# Patient Record
Sex: MALE | Race: AMERICAN INDIAN OR ALASKA NATIVE | NOT HISPANIC OR LATINO
[De-identification: names, ages, dates, MRNs, and addresses within clinical notes are randomized per-mention and may not be internally consistent; named-entity substitution may affect disease eponyms.]

---

## 2017-12-30 ENCOUNTER — RESULT REVIEW (OUTPATIENT)
Age: 57
End: 2017-12-30

## 2018-01-13 ENCOUNTER — RESULT REVIEW (OUTPATIENT)
Age: 58
End: 2018-01-13

## 2023-07-07 ENCOUNTER — EMERGENCY (EMERGENCY)
Facility: HOSPITAL | Age: 63
LOS: 1 days | Discharge: HOME | End: 2023-07-07
Attending: STUDENT IN AN ORGANIZED HEALTH CARE EDUCATION/TRAINING PROGRAM
Payer: MEDICARE

## 2023-07-07 VITALS
WEIGHT: 209.44 LBS | SYSTOLIC BLOOD PRESSURE: 121 MMHG | RESPIRATION RATE: 18 BRPM | TEMPERATURE: 100 F | OXYGEN SATURATION: 97 % | DIASTOLIC BLOOD PRESSURE: 73 MMHG | HEART RATE: 74 BPM | HEIGHT: 72 IN

## 2023-07-07 DIAGNOSIS — Z98.89 OTHER SPECIFIED POSTPROCEDURAL STATES: Chronic | ICD-10-CM

## 2023-07-07 PROCEDURE — 99285 EMERGENCY DEPT VISIT HI MDM: CPT

## 2023-07-07 RX ORDER — KETOROLAC TROMETHAMINE 30 MG/ML
15 SYRINGE (ML) INJECTION ONCE
Refills: 0 | Status: DISCONTINUED | OUTPATIENT
Start: 2023-07-07 | End: 2023-07-07

## 2023-07-07 RX ORDER — SODIUM CHLORIDE 9 MG/ML
1000 INJECTION INTRAMUSCULAR; INTRAVENOUS; SUBCUTANEOUS ONCE
Refills: 0 | Status: COMPLETED | OUTPATIENT
Start: 2023-07-07 | End: 2023-07-07

## 2023-07-07 RX ORDER — LIDOCAINE 4 G/100G
10 CREAM TOPICAL ONCE
Refills: 0 | Status: COMPLETED | OUTPATIENT
Start: 2023-07-07 | End: 2023-07-07

## 2023-07-07 NOTE — ED ADULT TRIAGE NOTE - CHIEF COMPLAINT QUOTE
" I have pain when I swallow, cough  knee pain, back pain two days, hiccups since last night  "  Referred by PMD seen yesterday  Patient had Stem cell done last year " I have pain when I swallow, cough  knee pain, back pain two days, hiccups since last night  "  Referred by PMD seen yesterday

## 2023-07-08 VITALS
HEART RATE: 77 BPM | TEMPERATURE: 100 F | RESPIRATION RATE: 16 BRPM | SYSTOLIC BLOOD PRESSURE: 100 MMHG | DIASTOLIC BLOOD PRESSURE: 66 MMHG | OXYGEN SATURATION: 95 %

## 2023-07-08 LAB
ALBUMIN SERPL ELPH-MCNC: 3.4 G/DL — LOW (ref 3.5–5)
ALP SERPL-CCNC: 56 U/L — SIGNIFICANT CHANGE UP (ref 40–120)
ALT FLD-CCNC: 28 U/L DA — SIGNIFICANT CHANGE UP (ref 10–60)
ANION GAP SERPL CALC-SCNC: 7 MMOL/L — SIGNIFICANT CHANGE UP (ref 5–17)
AST SERPL-CCNC: 26 U/L — SIGNIFICANT CHANGE UP (ref 10–40)
BASOPHILS # BLD AUTO: 0.02 K/UL — SIGNIFICANT CHANGE UP (ref 0–0.2)
BASOPHILS NFR BLD AUTO: 0.3 % — SIGNIFICANT CHANGE UP (ref 0–2)
BILIRUB SERPL-MCNC: 0.3 MG/DL — SIGNIFICANT CHANGE UP (ref 0.2–1.2)
BUN SERPL-MCNC: 11 MG/DL — SIGNIFICANT CHANGE UP (ref 7–18)
CALCIUM SERPL-MCNC: 8.2 MG/DL — LOW (ref 8.4–10.5)
CHLORIDE SERPL-SCNC: 98 MMOL/L — SIGNIFICANT CHANGE UP (ref 96–108)
CO2 SERPL-SCNC: 27 MMOL/L — SIGNIFICANT CHANGE UP (ref 22–31)
CREAT SERPL-MCNC: 0.86 MG/DL — SIGNIFICANT CHANGE UP (ref 0.5–1.3)
EGFR: 97 ML/MIN/1.73M2 — SIGNIFICANT CHANGE UP
EOSINOPHIL # BLD AUTO: 0.16 K/UL — SIGNIFICANT CHANGE UP (ref 0–0.5)
EOSINOPHIL NFR BLD AUTO: 2.7 % — SIGNIFICANT CHANGE UP (ref 0–6)
FLUAV AG NPH QL: SIGNIFICANT CHANGE UP
FLUBV AG NPH QL: SIGNIFICANT CHANGE UP
GLUCOSE SERPL-MCNC: 91 MG/DL — SIGNIFICANT CHANGE UP (ref 70–99)
HCT VFR BLD CALC: 38.7 % — LOW (ref 39–50)
HGB BLD-MCNC: 13.3 G/DL — SIGNIFICANT CHANGE UP (ref 13–17)
HIV 1 & 2 AB SERPL IA.RAPID: SIGNIFICANT CHANGE UP
IMM GRANULOCYTES NFR BLD AUTO: 0.3 % — SIGNIFICANT CHANGE UP (ref 0–0.9)
LIDOCAIN IGE QN: 172 U/L — SIGNIFICANT CHANGE UP (ref 73–393)
LYMPHOCYTES # BLD AUTO: 0.7 K/UL — LOW (ref 1–3.3)
LYMPHOCYTES # BLD AUTO: 11.9 % — LOW (ref 13–44)
MAGNESIUM SERPL-MCNC: 1.8 MG/DL — SIGNIFICANT CHANGE UP (ref 1.6–2.6)
MCHC RBC-ENTMCNC: 29.8 PG — SIGNIFICANT CHANGE UP (ref 27–34)
MCHC RBC-ENTMCNC: 34.4 GM/DL — SIGNIFICANT CHANGE UP (ref 32–36)
MCV RBC AUTO: 86.8 FL — SIGNIFICANT CHANGE UP (ref 80–100)
MONOCYTES # BLD AUTO: 0.8 K/UL — SIGNIFICANT CHANGE UP (ref 0–0.9)
MONOCYTES NFR BLD AUTO: 13.7 % — SIGNIFICANT CHANGE UP (ref 2–14)
NEUTROPHILS # BLD AUTO: 4.16 K/UL — SIGNIFICANT CHANGE UP (ref 1.8–7.4)
NEUTROPHILS NFR BLD AUTO: 71.1 % — SIGNIFICANT CHANGE UP (ref 43–77)
NRBC # BLD: 0 /100 WBCS — SIGNIFICANT CHANGE UP (ref 0–0)
NT-PROBNP SERPL-SCNC: 738 PG/ML — HIGH (ref 0–125)
PLATELET # BLD AUTO: 119 K/UL — LOW (ref 150–400)
POTASSIUM SERPL-MCNC: 4.1 MMOL/L — SIGNIFICANT CHANGE UP (ref 3.5–5.3)
POTASSIUM SERPL-SCNC: 4.1 MMOL/L — SIGNIFICANT CHANGE UP (ref 3.5–5.3)
PROT SERPL-MCNC: 6.6 G/DL — SIGNIFICANT CHANGE UP (ref 6–8.3)
RBC # BLD: 4.46 M/UL — SIGNIFICANT CHANGE UP (ref 4.2–5.8)
RBC # FLD: 11.3 % — SIGNIFICANT CHANGE UP (ref 10.3–14.5)
SARS-COV-2 RNA SPEC QL NAA+PROBE: DETECTED
SODIUM SERPL-SCNC: 132 MMOL/L — LOW (ref 135–145)
TROPONIN I, HIGH SENSITIVITY RESULT: 9.2 NG/L — SIGNIFICANT CHANGE UP
TSH SERPL-MCNC: 1.05 UU/ML — SIGNIFICANT CHANGE UP (ref 0.34–4.82)
WBC # BLD: 5.86 K/UL — SIGNIFICANT CHANGE UP (ref 3.8–10.5)
WBC # FLD AUTO: 5.86 K/UL — SIGNIFICANT CHANGE UP (ref 3.8–10.5)

## 2023-07-08 PROCEDURE — 71046 X-RAY EXAM CHEST 2 VIEWS: CPT | Mod: 26

## 2023-07-08 PROCEDURE — 99285 EMERGENCY DEPT VISIT HI MDM: CPT

## 2023-07-08 PROCEDURE — 96374 THER/PROPH/DIAG INJ IV PUSH: CPT

## 2023-07-08 PROCEDURE — 96375 TX/PRO/DX INJ NEW DRUG ADDON: CPT

## 2023-07-08 PROCEDURE — 71046 X-RAY EXAM CHEST 2 VIEWS: CPT

## 2023-07-08 RX ORDER — ACETAMINOPHEN 500 MG
650 TABLET ORAL ONCE
Refills: 0 | Status: COMPLETED | OUTPATIENT
Start: 2023-07-08 | End: 2023-07-08

## 2023-07-08 RX ORDER — CALCIUM GLUCONATE 100 MG/ML
1 VIAL (ML) INTRAVENOUS ONCE
Refills: 0 | Status: COMPLETED | OUTPATIENT
Start: 2023-07-08 | End: 2023-07-08

## 2023-07-08 RX ADMIN — Medication 100 GRAM(S): at 01:48

## 2023-07-08 RX ADMIN — LIDOCAINE 10 MILLILITER(S): 4 CREAM TOPICAL at 02:08

## 2023-07-08 RX ADMIN — Medication 30 MILLILITER(S): at 01:48

## 2023-07-08 RX ADMIN — SODIUM CHLORIDE 1000 MILLILITER(S): 9 INJECTION INTRAMUSCULAR; INTRAVENOUS; SUBCUTANEOUS at 01:49

## 2023-07-08 RX ADMIN — Medication 15 MILLIGRAM(S): at 06:36

## 2023-07-08 RX ADMIN — Medication 15 MILLIGRAM(S): at 02:30

## 2023-07-08 RX ADMIN — Medication 650 MILLIGRAM(S): at 06:36

## 2023-07-08 RX ADMIN — Medication 650 MILLIGRAM(S): at 04:05

## 2023-07-08 NOTE — ED PROVIDER NOTE - CARE PLAN
Principal Discharge DX:	2019 novel coronavirus disease (COVID-19)  Secondary Diagnosis:	Hypocalcemia  Secondary Diagnosis:	Dehydration   1

## 2023-07-08 NOTE — ED ADULT NURSE NOTE - CHIEF COMPLAINT QUOTE
" I have pain when I swallow, cough  knee pain, back pain two days, hiccups since last night  "  Referred by PMD seen yesterday

## 2023-07-08 NOTE — ED ADULT NURSE NOTE - NSFALLUNIVINTERV_ED_ALL_ED
Bed/Stretcher in lowest position, wheels locked, appropriate side rails in place/Call bell, personal items and telephone in reach/Instruct patient to call for assistance before getting out of bed/chair/stretcher/Non-slip footwear applied when patient is off stretcher/[place] to call system/Physically safe environment - no spills, clutter or unnecessary equipment/Purposeful proactive rounding/Room/bathroom lighting operational, light cord in reach

## 2023-07-08 NOTE — ED ADULT NURSE NOTE - PRIMARY CARE PROVIDER
unkn
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 \par \par TECHNIQUE: \par \par Sagittal T1, axial T2, FLAIR, and diffusion-weighted imaging of the brain \par was obtained along with coronal and sagittal FLAIR followed by thin section \par pre- contrast axial and coronal T1 imaging through the internal auditory \par canal and additional axial FIESTA sequence through the brain stem. \par Post-contrast thin section axial T1 with fat suppression and coronal T1 \par without fat suppression images were obtained of the internal auditory canal \par followed by whole brain axial, coronal, and sagittal T1 images. 6cc of \par Gadavist was given. \par \par COMPARISON: None. 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 \par \par The ventricles are unremarkable with respect to size and configuration. \par There is minimal nonspecific T2 prolongation within the periatrial \par periventricular white matter with a discrete focus in the left frontal deep \par white matter, within normal limits for patient age. There is no intracranial \par mass, midline shift, or focal mass effect present. No extra-axial collection \par is seen. There is no acute infarct on the diffusion-weighted sequence. \par Craniovertebral junction is normal. Sella turcica is unremarkable. \par \par Tympanomastoid cavities are predominantly clear. No air-fluid levels are \par seen in the paranasal sinuses. There is minor mucosal thickening in the \par ethmoid air cells bilaterally. Visualized vascular flow voids within limits \par of normal \par \par There is a right frontal lobe developmental venous anomaly. \par \par IMPRESSION: \par \par 1. Enhancing intracanalicular mass lesion in the right internal auditory \par canal consistent with an acoustic neuroma which measures up to 1.0 cm in \par length and extends to the porus acusticus with no extension into the right \par cerebellar pontine angle. \par 2. No brain parenchymal mass or acute infarct. \par 3. Right frontal lobe developmental venous anomaly. \par \par \par \par \par \par \par \par \par \par SHAYY RODRIGUEZ M.D., ATTENDING RADIOLOGIST \par This document has been electronically signed. Oct 4 2019 6:52PM \par

## 2023-07-08 NOTE — ED PROVIDER NOTE - CLINICAL SUMMARY MEDICAL DECISION MAKING FREE TEXT BOX
Florin Mehta DO: 62-year-old male, no reported PMH, PW 2 days of cough.  Patient reports malaise, body aches, F/C.  Also endorses hiccups.  Denies N/V, sick contacts, history of abdominal surgeries.  Denies CP, SOB.  Denies rash.  Denies weakness. Patient is HDS, well-appearing, neurovascular intact.  Found to have COVID while in ED.  After resuscitation and treatment, patient very well-appearing, ambulate throughout department.  Had extensive discussion with patient regarding care for COVID.  Has no comorbidities, did offer Paxlovid however patient declined.  Patient was due for overseas trip tomorrow however educated patient on risks of going overseas, patient states he will defer trip for now.  To follow-up with PCP.  Strict return precautions provided.

## 2023-07-08 NOTE — ED PROVIDER NOTE - NSFOLLOWUPINSTRUCTIONS_ED_ALL_ED_FT
1) Please follow up with your Primary Care Provider in 24-48 hours  2) Seek immediate medical care for any new or returning symptoms including but not limited severe pain, chest pain, shortness of breath, numbness and/or tinging  3) Take Tylenol 650 mg every 4-6 hours as needed for pain. Do not take more than 2 grams within a 24 hour period  4) Take Ibuprofen 400-600 mg every 4-6 hours as needed for pain. Do not take more than 1200 mg within a 24 hour period. Take this medication with food  5) Please ensure to remain hydrated

## 2023-07-08 NOTE — ED PROVIDER NOTE - PATIENT PORTAL LINK FT
You can access the FollowMyHealth Patient Portal offered by [facility] by registering at the following website: [email or web address]. By joining [facility]’s FollowMyHealth portal, you will also be able to view your health information using other applications (apps) compatible with our system.

## 2023-07-08 NOTE — ED PROVIDER NOTE - PHYSICAL EXAMINATION
anemia, diarrhea General: well appearing, interactive, well nourished, no apparent distress, ncat  HEENT: EOMI, PERRLA, normal mucosa, normal oropharynx, no lesions on the lips or on oral mucosa, normal external ear  Neck: supple, no lymphadenopathy, full range of motion, no nuchal rigidity  CV: RRR, normal S1 and S2 with no murmur, capillary refill less than two seconds, pp 2 +b/l  Resp: lungs CTA b/l, good aeration bilaterally, symmetric chest wall   Abd: non-distended, soft, non-tender  : no CVA tenderness  MSK: full range of motion, no cyanosis, no edema, no clubbing, no immobility  Neuro: CN II-XII grossly intact, muscle strength 5/5 in all extremities, normal gait  Skin: no rashes, skin intact

## 2023-07-08 NOTE — ED PROVIDER NOTE - OBJECTIVE STATEMENT
62-year-old male, no reported PMH, PW 2 days of cough.  Patient reports malaise, body aches, F/C.  Also endorses hiccups.  Denies N/V, sick contacts, history of abdominal surgeries.  Denies CP, SOB.  Denies rash.  Denies weakness.

## 2023-07-08 NOTE — ED ADULT NURSE NOTE - OBJECTIVE STATEMENT
pt came in c/o fever and throat pain for 2 days pt came in c/o fever and throat pain for 2 days, no acute distress, iv access and blood work done

## 2023-07-09 ENCOUNTER — EMERGENCY (EMERGENCY)
Facility: HOSPITAL | Age: 63
LOS: 1 days | Discharge: HOME | End: 2023-07-09
Attending: EMERGENCY MEDICINE
Payer: MEDICARE

## 2023-07-09 VITALS
TEMPERATURE: 98 F | SYSTOLIC BLOOD PRESSURE: 119 MMHG | HEART RATE: 70 BPM | DIASTOLIC BLOOD PRESSURE: 80 MMHG | OXYGEN SATURATION: 96 % | RESPIRATION RATE: 20 BRPM

## 2023-07-09 VITALS
TEMPERATURE: 98 F | HEIGHT: 72 IN | DIASTOLIC BLOOD PRESSURE: 84 MMHG | HEART RATE: 83 BPM | OXYGEN SATURATION: 100 % | SYSTOLIC BLOOD PRESSURE: 121 MMHG | RESPIRATION RATE: 18 BRPM | WEIGHT: 199.96 LBS

## 2023-07-09 DIAGNOSIS — Z98.89 OTHER SPECIFIED POSTPROCEDURAL STATES: Chronic | ICD-10-CM

## 2023-07-09 LAB
ALBUMIN SERPL ELPH-MCNC: 3.3 G/DL — LOW (ref 3.5–5)
ALP SERPL-CCNC: 53 U/L — SIGNIFICANT CHANGE UP (ref 40–120)
ALT FLD-CCNC: 32 U/L DA — SIGNIFICANT CHANGE UP (ref 10–60)
ANION GAP SERPL CALC-SCNC: 7 MMOL/L — SIGNIFICANT CHANGE UP (ref 5–17)
AST SERPL-CCNC: 43 U/L — HIGH (ref 10–40)
BASOPHILS # BLD AUTO: 0.02 K/UL — SIGNIFICANT CHANGE UP (ref 0–0.2)
BASOPHILS NFR BLD AUTO: 0.3 % — SIGNIFICANT CHANGE UP (ref 0–2)
BILIRUB SERPL-MCNC: 0.4 MG/DL — SIGNIFICANT CHANGE UP (ref 0.2–1.2)
BUN SERPL-MCNC: 7 MG/DL — SIGNIFICANT CHANGE UP (ref 7–18)
CALCIUM SERPL-MCNC: 8 MG/DL — LOW (ref 8.4–10.5)
CHLORIDE SERPL-SCNC: 102 MMOL/L — SIGNIFICANT CHANGE UP (ref 96–108)
CO2 SERPL-SCNC: 26 MMOL/L — SIGNIFICANT CHANGE UP (ref 22–31)
CREAT SERPL-MCNC: 0.8 MG/DL — SIGNIFICANT CHANGE UP (ref 0.5–1.3)
EGFR: 99 ML/MIN/1.73M2 — SIGNIFICANT CHANGE UP
EOSINOPHIL # BLD AUTO: 0.02 K/UL — SIGNIFICANT CHANGE UP (ref 0–0.5)
EOSINOPHIL NFR BLD AUTO: 0.3 % — SIGNIFICANT CHANGE UP (ref 0–6)
GLUCOSE SERPL-MCNC: 102 MG/DL — HIGH (ref 70–99)
HCT VFR BLD CALC: 40.9 % — SIGNIFICANT CHANGE UP (ref 39–50)
HGB BLD-MCNC: 14 G/DL — SIGNIFICANT CHANGE UP (ref 13–17)
IMM GRANULOCYTES NFR BLD AUTO: 0.3 % — SIGNIFICANT CHANGE UP (ref 0–0.9)
LYMPHOCYTES # BLD AUTO: 1.14 K/UL — SIGNIFICANT CHANGE UP (ref 1–3.3)
LYMPHOCYTES # BLD AUTO: 16.3 % — SIGNIFICANT CHANGE UP (ref 13–44)
MAGNESIUM SERPL-MCNC: 1.9 MG/DL — SIGNIFICANT CHANGE UP (ref 1.6–2.6)
MCHC RBC-ENTMCNC: 29.8 PG — SIGNIFICANT CHANGE UP (ref 27–34)
MCHC RBC-ENTMCNC: 34.2 GM/DL — SIGNIFICANT CHANGE UP (ref 32–36)
MCV RBC AUTO: 87 FL — SIGNIFICANT CHANGE UP (ref 80–100)
MONOCYTES # BLD AUTO: 0.62 K/UL — SIGNIFICANT CHANGE UP (ref 0–0.9)
MONOCYTES NFR BLD AUTO: 8.9 % — SIGNIFICANT CHANGE UP (ref 2–14)
NEUTROPHILS # BLD AUTO: 5.18 K/UL — SIGNIFICANT CHANGE UP (ref 1.8–7.4)
NEUTROPHILS NFR BLD AUTO: 73.9 % — SIGNIFICANT CHANGE UP (ref 43–77)
NRBC # BLD: 0 /100 WBCS — SIGNIFICANT CHANGE UP (ref 0–0)
NT-PROBNP SERPL-SCNC: 242 PG/ML — HIGH (ref 0–125)
PLATELET # BLD AUTO: 138 K/UL — LOW (ref 150–400)
POTASSIUM SERPL-MCNC: 3.8 MMOL/L — SIGNIFICANT CHANGE UP (ref 3.5–5.3)
POTASSIUM SERPL-SCNC: 3.8 MMOL/L — SIGNIFICANT CHANGE UP (ref 3.5–5.3)
PROT SERPL-MCNC: 6.5 G/DL — SIGNIFICANT CHANGE UP (ref 6–8.3)
RBC # BLD: 4.7 M/UL — SIGNIFICANT CHANGE UP (ref 4.2–5.8)
RBC # FLD: 11.6 % — SIGNIFICANT CHANGE UP (ref 10.3–14.5)
SODIUM SERPL-SCNC: 135 MMOL/L — SIGNIFICANT CHANGE UP (ref 135–145)
TROPONIN I, HIGH SENSITIVITY RESULT: 7.6 NG/L — SIGNIFICANT CHANGE UP
WBC # BLD: 7 K/UL — SIGNIFICANT CHANGE UP (ref 3.8–10.5)
WBC # FLD AUTO: 7 K/UL — SIGNIFICANT CHANGE UP (ref 3.8–10.5)

## 2023-07-09 PROCEDURE — 71260 CT THORAX DX C+: CPT | Mod: 26,MG

## 2023-07-09 PROCEDURE — G1004: CPT

## 2023-07-09 PROCEDURE — 99285 EMERGENCY DEPT VISIT HI MDM: CPT

## 2023-07-09 RX ORDER — KETOROLAC TROMETHAMINE 30 MG/ML
30 SYRINGE (ML) INJECTION ONCE
Refills: 0 | Status: DISCONTINUED | OUTPATIENT
Start: 2023-07-09 | End: 2023-07-09

## 2023-07-09 RX ORDER — DEXAMETHASONE 0.5 MG/5ML
6 ELIXIR ORAL ONCE
Refills: 0 | Status: COMPLETED | OUTPATIENT
Start: 2023-07-09 | End: 2023-07-09

## 2023-07-09 RX ADMIN — Medication 6 MILLIGRAM(S): at 13:34

## 2023-07-09 RX ADMIN — Medication 30 MILLIGRAM(S): at 13:34

## 2023-07-09 NOTE — ED ADULT TRIAGE NOTE - CHIEF COMPLAINT QUOTE
Cough and hiccups since friday. Endorses sore throat, difficulty swallowing and Bl rib cage pain with cough. Denies CP, SOB. Seen in Ed recently for same s/s.

## 2023-07-09 NOTE — ED PROVIDER NOTE - CLINICAL SUMMARY MEDICAL DECISION MAKING FREE TEXT BOX
62-year-old male no significant past medical history seen in ED yesterday found to have COVID presents to ED with cough and myalgias.  Symptoms likely related to COVID however patient says he feels symptoms are worsening.  Satting normal in the ED lungs clear on exam concern for possible superimposed infection.  Will get CT chest labs supportive care reassess

## 2023-07-09 NOTE — ED PROVIDER NOTE - PROGRESS NOTE DETAILS
patient signed out to me by Dr. Blue pending CT. CT showing multifocal pneumonia. likely 2/2 covid-19. patient updated on results. symptoms improved. stable for outpatient followup. mago Gale

## 2023-07-09 NOTE — ED PROVIDER NOTE - CCCP TRG CHIEF CMPLNT
RETURN TO THE EMERGENCY ROOM FOR THE FOLLOWING:    Severely worsened pain, expanding redness/swelling/tenderness, fever greater than 101, or at anytime for any concern.    FOLLOW UP:    With your primary clinic or back to the ED as needed.    TREATMENT RECOMMENDATIONS:    Ibuprofen 600 mg every six hours for pain (7 days duration).  Tylenol 1000 mg every six hours for pain (7 days duration).  Therefore, you can alternate these every three hours and do it safely.    NURSE ADVICE LINE:  [phone] or [phone]     cough

## 2023-07-09 NOTE — ED PROVIDER NOTE - OBJECTIVE STATEMENT
62-year-old male who denies any significant past medical history presents to ED with cough myalgias.  Patient seen in the ER yesterday was found to be COVID-positive.  As per patient since being discharged home he has vomited x2 and been unable to sleep secondary to myalgias and cough.  Cough associated with pain to bilateral chest.  Patient felt worse today so he came into ED for further evaluation

## 2023-07-09 NOTE — ED PROVIDER NOTE - PATIENT PORTAL LINK FT
You can access the FollowMyHealth Patient Portal offered by  by registering at the following website: [email or web address]. By joining [facility]’s FollowMyHealth portal, you will also be able to view your health information using other applications (apps) compatible with our system.

## 2023-07-09 NOTE — ED ADULT NURSE NOTE - OBJECTIVE STATEMENT
As per pt, c/o sore throat w/ difficulty swallowing, productive cough w/ brownish color phlegm, uncontrolled hiccups w/ bloated sensation, and burning sensation in the midsternal region and throat x3 days worsening today. Pt denies all other symptoms. Pt reports, he was seen and released for same complaint.

## 2023-07-09 NOTE — ED PROVIDER NOTE - NSFOLLOWUPINSTRUCTIONS_ED_ALL_ED_FT
COVID-19  COVID-19, or coronavirus disease 2019, is an infection that is caused by a new (novel) coronavirus called SARS-CoV-2. COVID-19 can cause many symptoms. In some people, the virus may not cause any symptoms. In others, it may cause mild or severe symptoms. Some people with severe infection develop severe disease.    What are the causes?  The human body, showing how the coronavirus travels from the air to a person's lungs.  This illness is caused by a virus. The virus may be in the air as tiny specks of fluid (aerosols) or droplets, or it may be on surfaces. You may catch the virus by:  Breathing in droplets from an infected person. Droplets can be spread by a person breathing, speaking, singing, coughing, or sneezing.  Touching something, like a table or a doorknob, that has virus on it (is contaminated) and then touching your mouth, nose, or eyes.  What increases the risk?  Risk for infection:    You are more likely to get infected with the COVID-19 virus if:  You are within 6 ft (1.8 m) of a person with COVID-19 for 15 minutes or longer.  You are providing care for a person who is infected with COVID-19.  You are in close personal contact with other people. Close personal contact includes hugging, kissing, or sharing eating or drinking utensils.  Risk for serious illness caused by COVID-19:    You are more likely to get seriously ill from the COVID-19 virus if:  You have cancer.  You have a long-term (chronic) disease, such as:  Chronic lung disease. This includes pulmonary embolism, chronic obstructive pulmonary disease, and cystic fibrosis.  Long-term disease that lowers your body's ability to fight infection (immunocompromise).  Serious cardiac conditions, such as heart failure, coronary artery disease, or cardiomyopathy.  Diabetes.  Chronic kidney disease.  Liver diseases. These include cirrhosis, nonalcoholic fatty liver disease, alcoholic liver disease, or autoimmune hepatitis.  You have obesity.  You are pregnant or were recently pregnant.  You have sickle cell disease.  What are the signs or symptoms?  Symptoms of this condition can range from mild to severe. Symptoms may appear any time from 2 to 14 days after being exposed to the virus. They include:  Fever or chills.  Shortness of breath or trouble breathing.  Feeling tired or very tired.  Headaches, body aches, or muscle aches.  Runny or stuffy nose, sneezing, coughing, or sore throat.  New loss of taste or smell. This is rare.  Some people may also have stomach problems, such as nausea, vomiting, or diarrhea.    Other people may not have any symptoms of COVID-19.    How is this diagnosed?  A sample being collected by swabbing the nose.  This condition may be diagnosed by testing samples to check for the COVID-19 virus. The most common tests are the PCR test and the antigen test. Tests may be done in the lab or at home. They include:  Using a swab to take a sample of fluid from the back of your nose and throat (nasopharyngeal fluid), from your nose, or from your throat.  Testing a sample of saliva from your mouth.  Testing a sample of coughed-up mucus from your lungs (sputum).  How is this treated?  Treatment for COVID-19 infection depends on the severity of the condition.  Mild symptoms can be managed at home with rest, fluids, and over-the-counter medicines.  Serious symptoms may be treated in a hospital intensive care unit (ICU). Treatment in the ICU may include:  Supplemental oxygen. Extra oxygen is given through a tube in the nose, a face mask, or a schaefer.  Medicines. These may include:  Antivirals, such as monoclonal antibodies. These help your body fight off certain viruses that can cause disease.  Anti-inflammatories, such as corticosteroids. These reduce inflammation and suppress the immune system.  Antithrombotics. These prevent or treat blood clots, if they develop.  Convalescent plasma. This helps boost your immune system, if you have an underlying immunosuppressive condition or are getting immunosuppressive treatments.  Prone positioning. This means you will lie on your stomach. This helps oxygen to get into your lungs.  Infection control measures.  If you are at risk for more serious illness caused by COVID-19, your health care provider may prescribe two long-acting monoclonal antibodies, given together every 6 months.    How is this prevented?  To protect yourself:    Use preventive medicine (pre-exposure prophylaxis). You may get pre-exposure prophylaxis if you have moderate or severe immunocompromise.  Get vaccinated. Anyone 6 months old or older who meets guidelines can get a COVID-19 vaccine or vaccine series. This includes people who are pregnant or making breast milk (lactating).  Get an added dose of COVID-19 vaccine after your first vaccine or vaccine series if you have moderate to severe immunocompromise. This applies if you have had a solid organ transplant or have been diagnosed with an immunocompromising condition.  You should get the added dose 4 weeks after you got the first COVID-19 vaccine or vaccine series.  If you get an mRNA vaccine, you will need a 3-dose primary series.  If you get the J&J/Jesus vaccine, you will need a 2-dose primary series, with the second dose being an mRNA vaccine.  Talk to your health care provider about getting experimental monoclonal antibodies. This treatment is approved under emergency use authorization to prevent severe illness before or after being exposed to the COVID-19 virus. You may be given monoclonal antibodies if:  You have moderate or severe immunocompromise. This includes treatments that lower your immune response. People with immunocompromise may not develop protection against COVID-19 when they are vaccinated.  You cannot be vaccinated. You may not get a vaccine if you have a severe allergic reaction to the vaccine or its components.  You are not fully vaccinated.  You are in a facility where COVID-19 is present and:  Are in close contact with a person who is infected with the COVID-19 virus.  Are at high risk of being exposed to the COVID-19 virus.  You are at risk of illness from new variants of the COVID-19 virus.  To protect others:    If you have symptoms of COVID-19, take steps to prevent the virus from spreading to others.  Stay home. Leave your house only to get medical care. Do not use public transit, if possible.  Do not travel while you are sick.  Wash your hands often with soap and water for at least 20 seconds. If soap and water are not available, use alcohol-based hand .  Make sure that all people in your household wash their hands well and often.  Cough or sneeze into a tissue or your sleeve or elbow. Do not cough or sneeze into your hand or into the air.  Where to find more information  Centers for Disease Control and Prevention: www.cdc.gov/coronavirus  World Health Organization: www.who.int/health-topics/coronavirus  Get help right away if:  You have trouble breathing.  You have pain or pressure in your chest.  You are confused.  You have bluish lips and fingernails.  You have trouble waking from sleep.  You have symptoms that get worse.  These symptoms may be an emergency. Get help right away. Call 911.  Do not wait to see if the symptoms will go away.  Do not drive yourself to the hospital.  Summary  COVID-19 is an infection that is caused by a new coronavirus.  Sometimes, there are no symptoms. Other times, symptoms range from mild to severe. Some people with a severe COVID-19 infection develop severe disease.  The virus that causes COVID-19 can spread from person to person through droplets or aerosols from breathing, speaking, singing, coughing, or sneezing.  Mild symptoms of COVID-19 can be managed at home with rest, fluids, and over-the-counter medicines.  This information is not intended to replace advice given to you by your health care provider. Make sure you discuss any questions you have with your health care provider.    Document Revised: 12/08/2022 Document Reviewed: 12/08/2022

## 2023-07-09 NOTE — ED ADULT NURSE NOTE - HIV OFFER
Admission Reconciliation is Completed  Discharge Reconciliation is Completed Previously Negative (within the last year)

## 2023-07-10 ENCOUNTER — INPATIENT (INPATIENT)
Facility: HOSPITAL | Age: 63
LOS: 1 days | Discharge: HOME | DRG: 177 | End: 2023-07-12
Attending: INTERNAL MEDICINE | Admitting: INTERNAL MEDICINE
Payer: MEDICARE

## 2023-07-10 VITALS
TEMPERATURE: 98 F | RESPIRATION RATE: 18 BRPM | HEART RATE: 86 BPM | HEIGHT: 72 IN | SYSTOLIC BLOOD PRESSURE: 129 MMHG | WEIGHT: 203.71 LBS | OXYGEN SATURATION: 98 % | DIASTOLIC BLOOD PRESSURE: 82 MMHG

## 2023-07-10 DIAGNOSIS — Z98.89 OTHER SPECIFIED POSTPROCEDURAL STATES: Chronic | ICD-10-CM

## 2023-07-10 PROCEDURE — 93010 ELECTROCARDIOGRAM REPORT: CPT

## 2023-07-10 PROCEDURE — 99285 EMERGENCY DEPT VISIT HI MDM: CPT

## 2023-07-10 NOTE — H&P ADULT - MLM HIDDEN
BMI=30.6( 04/02/19), Nutrition focused physical exam conducted; Subcutaneous fat Exam;  [ Mild  ]  Orbital fat pads region,  [ WNL  ]Buccal fat region,  [ Mild  ]triceps region, [ WNL   ]ribs region.  Muscle Exam; [ WNL  ]temples region, [ Mild  ]clavicle region, [ Mild   ]shoulder region, [  WNL ]Scapula region, [ Moderate  ]Interosseous region, [ Mild  ]thigh region, [  Mild ]Calf region/obese yes

## 2023-07-10 NOTE — H&P ADULT - HISTORY OF PRESENT ILLNESS
This is a 62 year old male, coming from home, with no medical history coming in with hiccups along with shortness of breath. Pt states this has ongoing since Thursday he has come to the ED multiple times for the complaints. He was found to have COVID, he also has been expereincing shortness of breath along with body aches headaches and chills at home. He also states when he has the bouts of hiccups they occur in 5's and he has a hard time breathing after for a few seconds. He denies any  visual disturbances, N/V/D, dizziness, falls, chest pain, palpitations, lower extremity swelling, skin rash, recent travel, or sick contacts

## 2023-07-10 NOTE — ED ADULT TRIAGE NOTE - TEMPERATURE IN CELSIUS (DEGREES C)
[facility]    Infectious Disease Consultation     Date of Admission:  12/23/2021  Date of Consult : 01/03/22    Assessment:  1. E.fecalis sepsis of urinary source with septic shock /multiorgan dysfunction -respiratoy failure, JULIANNE, elevated LFTs and required ventilation/ pressor support- all improved. Echocardiogram without evidence of endocarditis, blood cxs cleared rapidly which also makes endocarditis less ikely.  2. 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 Has bilateral renal calculi which will need further urology evaluation.  3. S.epidermidis bacteremia likely due to culture contamination. Polymicrobial bacteremia seems unlikely without indwelling cental line etc. Pump site does not appear infected, there is no overlying erythema and CT abdomen did not show stranding or fluid collection around pump site.  4. Transminitis related to septic shock -improving  5. JULIANNE resolved  6. Atrial fibrillation, on amiodarone and in sinus rhythm  7. Densities along the pelvic sidewall, unclear whether chronic fluid collection/seroma. or adenopathy. In view of sepsis, wound recommend re evaluation with imaging .  8. Cholelithiasis  9. Chronic medical conditions - DM, HTN, hyperlipidemia    Recommendations:  1. Discontinue Zosyn  2. Transition to Ampicillin . Treat E.fecalis bacteremia for a total of 2 weeks from negative blood cxs (12/25 ) until 1/7/2022  2. S.epi likely contaminant  3. Consider repeating CT abdomen to assess densities noted along pelvic wall, if deemed to be fluid collections then would recommend aspiration in view of recent severe sepsis to exclude infection.  4. No obvious sign of pump infection at this time, will need to be monitored when off antibiotics and if has recurrent signs of infection then will need to cosider pump removal    Hazel Villagomez MD    Reason for Consult   I was asked to  evaluate this patient for antimicrobial recommendations for sepsis    Primary Care Physician   Julius Hassan    Chief Complaint   Septic shock    History is obtained from the patient and medical records    History of Present Illness   Amanda Richardson is a 56 year old female with obesity, MS -has  baclofen pump for chronic pain syndrome, diabetes, hypertension, hyperlipidemia, CKD, hx od Non-hodgkin's lymphoma, who has been hospitalized since 12/23/21 with  septic shock of urinary source. She was intubated with hypoxic respiratory failure and required pressor support. She has recovered and moved out of the ICU. Her blood cxs wer positive for E.fecalis and s.epidermidis and catheterized urine specimen grew multiple bacteria. She has been maintained on Zosyn and ID has been asked to assist with antibiotic management.    She had marked leukocytosis of 34.7K which has improved to 13.5 k now, transaminitis and CRP are improving as well. She has some abdominal discomfort but no erythema or induration overlying the baclofen pump. She feels improved overall .    Antimicrobial therapy  12/24 Zosyn  Prior  12/24-12/26 Vancomycin    Past Medical History   I have reviewed this patient's medical history and updated it with pertinent information if needed.   Past Medical History:   Diagnosis Date     Abnormality of gait 07/27/2012     Arrhythmia     with sepsis     Chronic pain     [facility] - yearly, next in summer 2018     CKD (chronic kidney disease) stage 1, GFR 90 ml/min or greater     kidney stones     Colon polyps 01/2015    tubular adenomas x 2     Depressive disorder      Gallstone 06/11/2012     Hyperlipidemia LDL goal <70      Hypertension goal BP (blood pressure) < 140/90      Leukocytosis 06/11/2012     Moderate depressive episode (H)      MS (multiple sclerosis) (H) 2003    Dr Vigil/Gaby - [facility]     Multiple sclerosis (H)      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  Past Surgical History:   Procedure Laterality Date     COLONOSCOPY N/A 1/7/2015    tubular adenomas x 2 - due 5 yrs     COMBINED CYSTOSCOPY, RETROGRADES, URETEROSCOPY, INSERT STENT Left 1/30/2019    Procedure: 1. Cystoscopy 2. LEFT retrograde pyelogram 3. LEFT JJ stent placement 4. <1hr physician fluoroscopy time;  Surgeon: Epifanio Sapp MD;  Location: [facility] OR     COMBINED CYSTOSCOPY, RETROGRADES, URETEROSCOPY, LASER HOLMIUM LITHOTRIPSY URETER(S), INSERT STENT Left 3/15/2019    Procedure: Cystoscopy, left ureteral stent exchange, left retrograde pyelogram, interpretation of fluoroscopic images, left ureteroscopy with holmium lithotripsy and stone basketing, 22 modifier for difficult lengthy case.;  Surgeon: Mayito hCauhan MD;  Location: [facility] OR     CYSTOSCOPY       CYSTOSCOPY, REMOVE STENT(S), COMBINED Left 3/27/2019    Procedure: Flexible cystoscopy with left ureteral stent removal;  Surgeon: Mayito Chauhan MD;  Location: [facility] OR     FUSION LUMBAR ANTERIOR, FUSION LUMBAR POSTERIOR TWO LEVELS, COMBINED   10/17/2013    lumbar fusion - Dr Floyd     INSERT PUMP BACLOFEN  2017    intrathecal baclofen pump implantation     IRRIGATION AND DEBRIDEMENT LOWER EXTREMITY, COMBINED Right     Right ankle I&D d/t infection     OPEN REDUCTION INTERNAL FIXATION ANKLE  5/15    Right Bimalleolar ankle fx ORIF     OPEN REDUCTION INTERNAL FIXATION ANKLE Right 2015    Revision due to spasms pulling screws out of ankle     SINUS SURGERY      Non hodgkins lymphoma - T cell - left nasal sinus     XR LUMBAR EPIDURAL INJECTION INCL IMAGING  3/14    Left L4-5 Epidural Dr Winter       Prior to Admission Medications   Prior to Admission Medications   Prescriptions Last Dose Informant Patient Reported? Taking?   B Complex Vitamins (B COMPLEX PO) Unknown at Unknown time Spouse/Significant Other Yes Yes   Sig: Take 1 tablet by mouth daily   CHLORPHENIRAMINE-ACETAMINOPHEN PO Unknown at Unknown time Spouse/Significant Other Yes Yes   Sig: Take 2 tablets by mouth Tablet contains acetaminophen 500 mg, dextromethorphan 15 mg, chlorpheniramine 2 mg   Chlorpheniramine-DM (COUGH & COLD HBP) 4-30 MG TABS Unknown at Unknown time Spouse/Significant Other Yes Yes   Sig: Take 1 tablet by mouth every 6 hours as needed   DULoxetine (CYMBALTA) 60 MG capsule Unknown at Unknown time Spouse/Significant Other No Yes   Sig: Take 1 capsule (60 mg) by mouth 2 times daily   Hesperidin-Diosmin 250-650 MG TABS Unknown at Unknown time Spouse/Significant Other No Yes   Si tabs daily per wound care   Multiple Vitamin (MULTI-VITAMIN PO) Unknown at Unknown time Spouse/Significant Other Yes Yes   Sig: Take 1 tablet by mouth daily    Vitamin D3 (CHOLECALCIFEROL) 125 MCG (5000 UT) tablet Unknown at Unknown time Spouse/Significant Other Yes Yes   Sig: Take 2 tablets by mouth every morning   acetaminophen (TYLENOL) 325 MG tablet Unknown at Unknown time Spouse/Significant Other No Yes   Sig: Take 2 tablets (650 mg) by mouth every 4 hours as needed for mild  pain   amitriptyline (ELAVIL) 100 MG tablet Not Taking at Unknown time Spouse/Significant Other No No   Sig: Take 1 tablet (100 mg) by mouth At Bedtime   Patient not taking: Reported on 12/24/2021   aspirin (ASA) 81 MG chewable tablet Unknown at Unknown time Spouse/Significant Other Yes Yes   Sig: Take 162 mg by mouth every morning    atorvastatin (LIPITOR) 10 MG tablet Unknown at Unknown time Spouse/Significant Other No Yes   Sig: Take 1 tablet (10 mg) by mouth daily   baclofen (LIORESAL) 10 MG tablet Unknown at Unknown time Spouse/Significant Other Yes Yes   Sig: 10 mg by Per G Tube route 3 times daily as needed for muscle spasms In addition to pump   baclofen (LIORESAL) intraTHECAL Internal Pump 12/24/2021 at Unknown time Spouse/Significant Other Yes Yes   Sig: by Intrathecal route continuous prn (467.6 mcg/24h simple continuous infusion) Dr. Griffin [facility] manages, pump refill due 5/2022. Settings last updated 12/13/2021.   blood glucose (ACCU-CHEK KEL) test strip   No No   Sig: Use to test blood sugar 1 times daily or as directed.   blood glucose (NO BRAND SPECIFIED) lancets standard   No No   Sig: Use to test blood sugar 1 times daily or as directed.   blood glucose calibration (NO BRAND SPECIFIED) solution   No No   Sig: Use to calibrate blood glucose monitor as needed as directed.   blood glucose monitoring (ACCU-CHEK KEL PLUS) meter device kit   No No   Sig: Use to test blood sugar 1 times daily or as directed.   blood glucose monitoring (ACCU-CHEK MULTICLIX) lancets   No No   Sig: Use to test blood sugar 1 times daily or as directed.   doxycycline hyclate (VIBRA-TABS) 100 MG tablet 12/23/2021 at 15 tablets remain in Rx bottle Spouse/Significant Other No Yes   Sig: Take 1 tablet (100 mg) by mouth 2 times daily for 10 days   ibuprofen (ADVIL/MOTRIN) 200 MG tablet Unknown at Unknown time Spouse/Significant Other Yes Yes   Sig: Take 200 mg by mouth every 4 hours as needed for mild pain    losartan-hydrochlorothiazide (HYZAAR) 50-12.5 MG tablet Unknown at Unknown time Spouse/Significant Other No Yes   Sig: Take 1 tablet by mouth daily   metFORMIN (GLUCOPHAGE) 500 MG tablet Unknown at Unknown time Spouse/Significant Other No Yes   Si Tabs QAM and 2 Tabs QPM   metoprolol succinate ER (TOPROL-XL) 50 MG 24 hr tablet Unknown at Unknown time Spouse/Significant Other No Yes   Sig: Take 1 tablet (50 mg) by mouth daily   naloxone (NARCAN) 4 MG/0.1ML nasal spray Unknown at spouse confirmed pt has home supply Spouse/Significant Other No Yes   Sig: Spray 1 spray (4 mg) into one nostril alternating nostrils once as needed for opioid reversal Every 2-3 minutes until patient responsive or EMS arrives   omega-3 fatty acids (FISH OIL) 1200 MG capsule Unknown at Unknown time Spouse/Significant Other Yes Yes   Sig: Take 1 capsule by mouth daily.   oxyCODONE IR (ROXICODONE) 15 MG tablet Unknown at Unknown time Spouse/Significant Other No Yes   Sig: Take 1 tablet (15 mg) by mouth every 6 hours as needed for moderate to severe pain Limit 4 tablet(s) per day.   senna-docusate (SENOKOT-S/PERICOLACE) 8.6-50 MG tablet Unknown at Unknown time Spouse/Significant Other No Yes   Sig: Take 2 tablets by mouth daily as needed for constipation      Facility-Administered Medications: None     Allergies   Allergies   Allergen Reactions     Lisinopril Angioedema     Lip swelling     Flexeril [Cyclobenzaprine Hcl] Other (See Comments)     confusion       Immunization History   Immunization History   Administered Date(s) Administered     COVID-19,PF,Pfizer (12+ Yrs) 2021, 2021     Flu, Unspecified 12/10/2009     Influenza (H1N1) 12/10/2009     Influenza (IIV3) PF 12/10/2009, 2012, 2014, 10/01/2020     Influenza Quad, Recombinant, pf(RIV4) (Flublok) 2020     Influenza Vaccine IM > 6 months Valent IIV4 (Alfuria,Fluzone) 10/21/2013, 2014, 2018     Influenza Vaccine, 6+MO IM (QUADRIVALENT  W/PRESERVATIVES) 09/28/2017     Pneumococcal 23 valent 01/05/2011, 01/29/2020     TDAP Vaccine (Adacel) 09/19/2018     Tdap (Adacel,Boostrix) 09/17/2008     Zoster vaccine recombinant adjuvanted (SHINGRIX) 07/29/2019, 01/29/2020       Social History   I have reviewed this patient's social history and updated it with pertinent information if needed. Amanda Richardson  reports that she quit smoking about 8 years ago. Her smoking use included cigarettes. She has a 15.50 pack-year smoking history. She has never used smokeless tobacco. She reports that she does not drink alcohol and does not use drugs.    Family History   I have reviewed this patient's family history and updated it with pertinent information if needed.   Family History   Problem Relation Age of Onset     C.A.D. Father         with CHF      Cancer Father         ? unsure type - abdominal      Hypertension Mother      Thyroid Disease Mother         goiter      Breast Cancer Sister 58     Thyroid Disease Son      Cancer Paternal Grandfather      Cancer - colorectal No family hx of        Review of Systems   The 10 point Review of Systems is negative other than noted in the HPI or here.     Physical Exam   Temp: 98.6  F (37  C) Temp src: Axillary BP: (!) 194/91 Pulse: 82   Resp: 20 SpO2: 91 % O2 Device: Oxymask Oxygen Delivery: 7 LPM  Vital Signs with Ranges  Temp:  [97  F (36.1  C)-98.6  F (37  C)] 98.6  F (37  C)  Pulse:  [74-82] 82  Resp:  [20-22] 20  BP: (150-194)/(62-91) 194/91  SpO2:  [90 %-94 %] 91 %  273 lbs 0 oz  Body mass index is 40.3 kg/m .    GENERAL APPEARANCE:  Awake, has BiPAP mask on  EYES: Eyes grossly normal to inspection  NECK: no adenopathy  RESP: lungs clear   CV: regular rates and rhythm  ABDOMEN: soft, some discomfort over pump site but no erythema or induration  MS: chronic stasis changes, skin thickening and discoloration    Data   All laboratory data reviewed  Component      Latest Ref Rng & Units 1/3/2022   N-Terminal Pro BNP  Inpatient      0 - 900 pg/mL 3,857 (H)     Component      Latest Ref Rng & Units 1/3/2022   WBC      4.0 - 11.0 10e3/uL 13.5 (H)   RBC Count      3.80 - 5.20 10e6/uL 5.28 (H)   Hemoglobin      11.7 - 15.7 g/dL 11.5 (L)   Hematocrit      35.0 - 47.0 % 42.6   MCV      78 - 100 fL 81   MCH      26.5 - 33.0 pg 21.8 (L)   MCHC      31.5 - 36.5 g/dL 27.0 (L)   RDW      10.0 - 15.0 % 21.8 (H)   Platelet Count      150 - 450 10e3/uL 316     Component      Latest Ref Rng & Units 1/3/2022   Sodium      133 - 144 mmol/L 142   Potassium      3.4 - 5.3 mmol/L 3.5   Chloride      94 - 109 mmol/L 109   Carbon Dioxide      20 - 32 mmol/L 31   Anion Gap      3 - 14 mmol/L 2 (L)   Urea Nitrogen      7 - 30 mg/dL 15   Creatinine      0.52 - 1.04 mg/dL 0.56   Calcium      8.5 - 10.1 mg/dL 8.2 (L)   Glucose      70 - 99 mg/dL 149 (H)   Alkaline Phosphatase      40 - 150 U/L 147   AST      0 - 45 U/L 39   ALT      0 - 50 U/L 162 (H)   Protein Total      6.8 - 8.8 g/dL 8.2   Albumin      3.4 - 5.0 g/dL 2.1 (L)   Bilirubin Total      0.2 - 1.3 mg/dL 0.7     Microbiology  12/23 blood cxs 1 set 2/2 bottles E.fecalis and both sets 2/4 bottles S.epidermidis, 12/25 blood cxs negative      Culture Positive on the 1st day of incubation Abnormal        Enterococcus faecalis Panic     2 of 2 bottles   Staphylococcus epidermidis Panic     1 of 2 bottles        Resulting Agency: [facility]       Susceptibility     Enterococcus faecalis Staphylococcus epidermidis     SUSY SUSY     Ampicillin <=2.0 ug/mL Susceptible       Ciprofloxacin   2.0 ug/mL Intermediate     Clindamycin   >=8.0 ug/mL Resistant     Erythromycin   >=8.0 ug/mL Resistant     Gentamicin   8.0 ug/mL Intermediate     Gentamicin Synergy Susceptible... Susceptible 1       Levofloxacin   4.0 ug/mL Resistant     Oxacillin   <=0.25 ug/mL Susceptible 2     Penicillin 2.0 ug/mL Susceptible       Tetracycline   <=1.0 ug/mL Susceptible     Vancomycin 1.0 ug/mL Susceptible 2.0 ug/mL Susceptible                  Imaging  12/23 Ct chest abdomen pelvis  EXAM: CT CHEST ABDOMEN PELVIS W/O CONTRAST  LOCATION: [facility]  DATE/TIME: 12/23/2021 10:21 PM     INDICATION: Sepsis  COMPARISON: 03/16/2019  TECHNIQUE: CT scan of the chest, abdomen, and pelvis was performed without IV contrast. Multiplanar reformats were obtained. Dose reduction techniques were used.   CONTRAST: None.     FINDINGS:   LUNGS AND PLEURA: Mosaic attenuation. Bibasilar atelectasis or infiltrate. Trace pleural effusions.     MEDIASTINUM/AXILLAE: No adenopathy or pericardial effusion. Endotracheal and enteric tube.     CORONARY ARTERY CALCIFICATION: Mild-to-moderate.     HEPATOBILIARY: Cholelithiasis.     PANCREAS: Normal.     SPLEEN: Normal.     ADRENAL GLANDS: Thickening of the adrenal glands.     KIDNEYS/BLADDER: Multiple, bilateral renal calculi. Larger on the left 7 mm. Small amount of air in the left renal collecting system. Bobo within the bladder.     BOWEL: Normal caliber.     LYMPH NODES: Bilateral hypodensity along the pelvic sidewall. On the right 4.7 x 2.3 cm series 4 image 256 and on the left 3.9 x 1.7 cm image 262.     VASCULATURE: Atherosclerotic vascular calcification.     PELVIC ORGANS: 1.6 cm fatty lesion within the uterus.     MUSCULOSKELETAL: Postsurgical change lumbar spine. Chronic fracture right proximal femur. Degenerative change osseous structures. Implanted device right ventral abdomen.                                                                      IMPRESSION:  1.  Bibasilar atelectasis or infiltrate and trace pleural effusions.  2.  Small amount of air in the left renal collecting system. Was there recent instrumentation? Correlate for emphysematous pyelitis.  3.  Cholelithiasis. Gallbladder wall thickening not excluded.  4.  Trace amount of free fluid.  5.  Bilateral renal calculi.  6.  1.6 cm fatty lesion within the uterus.  7.  Bilateral oval densities along the pelvic sidewall. Uncertain if  these are chronic fluid collection/seroma. Possibility of adenopathy not excluded. Follow-up recommended.    12/23 TTE  Interpretation Summary     1. Normal left ventricular size and function. Left ventricular ejection  fraction of 60-65%. No segmental wall motion abnormalities noted.  2. The right ventricle is mildly dilated. The right ventricular systolic  function is normal.  3. Valves not well assessed on this technically difficult study.  Compared to the previous echocardiogram on 2/1/2019, there are no significant  changes. Technically very difficult study.   36.9

## 2023-07-10 NOTE — H&P ADULT - NSHPREVIEWOFSYSTEMS_GEN_ALL_CORE
CONSTITUTIONAL: Chills  RESPIRATORY: Dry Cough with shortness of breath.   CARDIOVASCULAR: No chest pain, palpitations, dizziness, or leg swelling  GASTROINTESTINAL: No abdominal pain. No nausea, vomiting, or hematemesis; No diarrhea or constipation. No melena or hematochezia. Hiccups  GENITOURINARY: No dysuria or hematuria, urinary frequency  NEUROLOGICAL: No headaches, memory loss, loss of strength, numbness, or tremors  ENDOCRINE: No polyuria, polydipsia, or heat/cold intolerance  MUSCULOSKELETAL: No muscle aches, joint pains  HEME: no easy bruisability, no tender or enlarged lymph nodes  SKIN: No itching, burning, rashes, or lesions .

## 2023-07-10 NOTE — H&P ADULT - ATTENDING COMMENTS
Pt seen on behalf of Dr Poe    Vital Signs Last 24 Hrs  T(C): 36.9 (10 Jul 2023 21:53), Max: 36.9 (10 Jul 2023 21:53)  T(F): 98.4 (10 Jul 2023 21:53), Max: 98.4 (10 Jul 2023 21:53)  HR: 86 (10 Jul 2023 21:53) (86 - 86)  BP: 129/82 (10 Jul 2023 21:53) (129/82 - 129/82)  BP(mean): --  RR: 18 (10 Jul 2023 21:53) (18 - 18)  SpO2: 98% (10 Jul 2023 21:53) (98% - 98%)    Parameters below as of 10 Jul 2023 21:53  Patient On (Oxygen Delivery Method): room air Pt seen on behalf of Dr Robison    62 year old man with no medical hx of note who has been in the ED a few times in the last days with complaints of cough, malaise, body aches and hiccups.       Vital Signs Last 24 Hrs  T(C): 36.9 (10 Jul 2023 21:53), Max: 36.9 (10 Jul 2023 21:53)  T(F): 98.4 (10 Jul 2023 21:53), Max: 98.4 (10 Jul 2023 21:53)  HR: 86 (10 Jul 2023 21:53) (86 - 86)  BP: 129/82 (10 Jul 2023 21:53) (129/82 - 129/82)  RR: 18 (10 Jul 2023 21:53) (18 - 18)  SpO2: 98% (10 Jul 2023 21:53) (98% - 98%)    Labs   cbc - unremarkable   chem- grossly intact  COVID 19 -detected    CT chest  Confluent ground-glass opacity in the left upper lobe lingula segment,   left lower lobe and middle lobe compatible with multifocal pneumonia   which may be of nonbacterial origin.    Impression   - Acute viral  pneumonia with covid 19   - Physical debility - illness induced    Plan   Admit to Medicine with isolation precaution   Supportive care with antitussive and antiehiccups    Dexamethasone Pt seen on behalf of Dr Robison    62 year old man with no medical hx of note who has been in the ED a few times in the last days with complaints of cough, malaise, body aches and hiccups.       Vital Signs Last 24 Hrs  T(C): 36.9 (10 Jul 2023 21:53), Max: 36.9 (10 Jul 2023 21:53)  T(F): 98.4 (10 Jul 2023 21:53), Max: 98.4 (10 Jul 2023 21:53)  HR: 86 (10 Jul 2023 21:53) (86 - 86)  BP: 129/82 (10 Jul 2023 21:53) (129/82 - 129/82)  RR: 18 (10 Jul 2023 21:53) (18 - 18)  SpO2: 98% (10 Jul 2023 21:53) (98% - 98%)    Labs   cbc - unremarkable   chem- grossly intact  COVID 19 -detected    CT chest  Confluent ground-glass opacity in the left upper lobe lingula segment,   left lower lobe and middle lobe compatible with multifocal pneumonia   which may be of nonbacterial origin.    Impression   - Acute viral  pneumonia with COVID 19   - Persistent cough and hiccup    Plan   Admit to Medicine with isolation precaution   Supportive care with antitussive and anti-hiccups  No indication for steroids

## 2023-07-10 NOTE — H&P ADULT - ASSESSMENT
This is a 62 year old male, coming from home, with no medical history being admitted for covid and intractable hiccups.

## 2023-07-10 NOTE — H&P ADULT - NSHPPHYSICALEXAM_GEN_ALL_CORE
Vital Signs Last 24 Hrs  T(C): 37 (11 Jul 2023 00:33), Max: 37 (11 Jul 2023 00:33)  T(F): 98.6 (11 Jul 2023 00:33), Max: 98.6 (11 Jul 2023 00:33)  HR: 79 (11 Jul 2023 00:33) (79 - 86)  BP: 117/80 (11 Jul 2023 00:33) (117/80 - 129/82)  BP(mean): --  RR: 18 (11 Jul 2023 00:33) (18 - 18)  SpO2: 100% (11 Jul 2023 00:33) (98% - 100%)    Parameters below as of 11 Jul 2023 00:33  Patient On (Oxygen Delivery Method): room air    PHYSICAL EXAM:  GENERAL: NAD, speaks in full sentences, no signs of respiratory distress  HEAD:  Atraumatic, Normocephalic  EYES: EOMI, PERRLA, conjunctiva and sclera clear  NECK: Supple, No JVD  CHEST/LUNG: Clear to auscultation bilaterally; No wheeze; No crackles; No accessory muscles used  HEART: Regular rate and rhythm; No murmurs;   ABDOMEN: Soft, Nontender, Nondistended; Bowel sounds present; No guarding  EXTREMITIES:  2+ Peripheral Pulses, No cyanosis or edema  PSYCH: AAOx3  NEUROLOGY: non-focal  SKIN: No rashes or lesions

## 2023-07-10 NOTE — H&P ADULT - PROBLEM SELECTOR PLAN 2
Pt presenting with intractable hiccups.   Suspecting in the setting of GERD   Trial of PPI   if no improvement can try baclofen.

## 2023-07-10 NOTE — H&P ADULT - PROBLEM SELECTOR PLAN 1
saturating well on room air  holding remdesivir x5d + Decadron x10 days as pt doesn't have symptoms.   Obtain ambulatory O2 and document  Tylenol prn, tessalon perles, albuterol q6  incentive spirometer  CT: Confluent ground-glass opacity in the left upper lobe lingula segment, left lower lobe and middle lobe compatible with multifocal pneumonia which may be of nonbacterial origin.  Maintain O2 92-95%, o2 support titrate as needed.

## 2023-07-11 ENCOUNTER — TRANSCRIPTION ENCOUNTER (OUTPATIENT)
Age: 63
End: 2023-07-11

## 2023-07-11 DIAGNOSIS — Z29.9 ENCOUNTER FOR PROPHYLACTIC MEASURES, UNSPECIFIED: ICD-10-CM

## 2023-07-11 DIAGNOSIS — U07.1 COVID-19: ICD-10-CM

## 2023-07-11 DIAGNOSIS — R06.6 HICCOUGH: ICD-10-CM

## 2023-07-11 DIAGNOSIS — R91.8 OTHER NONSPECIFIC ABNORMAL FINDING OF LUNG FIELD: ICD-10-CM

## 2023-07-11 LAB
ALBUMIN SERPL ELPH-MCNC: 3 G/DL — LOW (ref 3.5–5)
ALBUMIN SERPL ELPH-MCNC: 3.1 G/DL — LOW (ref 3.5–5)
ALBUMIN SERPL ELPH-MCNC: 3.4 G/DL — LOW (ref 3.5–5)
ALP SERPL-CCNC: 53 U/L — SIGNIFICANT CHANGE UP (ref 40–120)
ALP SERPL-CCNC: 54 U/L — SIGNIFICANT CHANGE UP (ref 40–120)
ALP SERPL-CCNC: 55 U/L — SIGNIFICANT CHANGE UP (ref 40–120)
ALT FLD-CCNC: 33 U/L DA — SIGNIFICANT CHANGE UP (ref 10–60)
ALT FLD-CCNC: 38 U/L DA — SIGNIFICANT CHANGE UP (ref 10–60)
ALT FLD-CCNC: 40 U/L DA — SIGNIFICANT CHANGE UP (ref 10–60)
ANION GAP SERPL CALC-SCNC: 8 MMOL/L — SIGNIFICANT CHANGE UP (ref 5–17)
APTT BLD: 28.2 SEC — SIGNIFICANT CHANGE UP (ref 27.5–35.5)
AST SERPL-CCNC: 46 U/L — HIGH (ref 10–40)
AST SERPL-CCNC: 47 U/L — HIGH (ref 10–40)
AST SERPL-CCNC: 52 U/L — HIGH (ref 10–40)
BASOPHILS # BLD AUTO: 0.01 K/UL — SIGNIFICANT CHANGE UP (ref 0–0.2)
BASOPHILS NFR BLD AUTO: 0.1 % — SIGNIFICANT CHANGE UP (ref 0–2)
BILIRUB DIRECT SERPL-MCNC: 0.1 MG/DL — SIGNIFICANT CHANGE UP (ref 0–0.3)
BILIRUB DIRECT SERPL-MCNC: 0.1 MG/DL — SIGNIFICANT CHANGE UP (ref 0–0.3)
BILIRUB INDIRECT FLD-MCNC: 0.3 MG/DL — SIGNIFICANT CHANGE UP (ref 0.2–1)
BILIRUB INDIRECT FLD-MCNC: 0.4 MG/DL — SIGNIFICANT CHANGE UP (ref 0.2–1)
BILIRUB SERPL-MCNC: 0.4 MG/DL — SIGNIFICANT CHANGE UP (ref 0.2–1.2)
BILIRUB SERPL-MCNC: 0.5 MG/DL — SIGNIFICANT CHANGE UP (ref 0.2–1.2)
BILIRUB SERPL-MCNC: 0.5 MG/DL — SIGNIFICANT CHANGE UP (ref 0.2–1.2)
BUN SERPL-MCNC: 12 MG/DL — SIGNIFICANT CHANGE UP (ref 7–18)
CALCIUM SERPL-MCNC: 8.2 MG/DL — LOW (ref 8.4–10.5)
CHLORIDE SERPL-SCNC: 103 MMOL/L — SIGNIFICANT CHANGE UP (ref 96–108)
CO2 SERPL-SCNC: 26 MMOL/L — SIGNIFICANT CHANGE UP (ref 22–31)
CREAT SERPL-MCNC: 0.88 MG/DL — SIGNIFICANT CHANGE UP (ref 0.5–1.3)
CREAT SERPL-MCNC: 0.89 MG/DL — SIGNIFICANT CHANGE UP (ref 0.5–1.3)
CREAT SERPL-MCNC: 0.92 MG/DL — SIGNIFICANT CHANGE UP (ref 0.5–1.3)
CRP SERPL-MCNC: 23 MG/L — HIGH
D DIMER BLD IA.RAPID-MCNC: 228 NG/ML DDU — SIGNIFICANT CHANGE UP
EGFR: 93 ML/MIN/1.73M2 — SIGNIFICANT CHANGE UP
EGFR: 96 ML/MIN/1.73M2 — SIGNIFICANT CHANGE UP
EGFR: 96 ML/MIN/1.73M2 — SIGNIFICANT CHANGE UP
EOSINOPHIL # BLD AUTO: 0.02 K/UL — SIGNIFICANT CHANGE UP (ref 0–0.5)
EOSINOPHIL NFR BLD AUTO: 0.2 % — SIGNIFICANT CHANGE UP (ref 0–6)
FERRITIN SERPL-MCNC: 785 NG/ML — HIGH (ref 30–400)
GLUCOSE SERPL-MCNC: 96 MG/DL — SIGNIFICANT CHANGE UP (ref 70–99)
HCT VFR BLD CALC: 41 % — SIGNIFICANT CHANGE UP (ref 39–50)
HCV AB S/CO SERPL IA: 0.07 S/CO — SIGNIFICANT CHANGE UP (ref 0–0.99)
HCV AB SERPL-IMP: SIGNIFICANT CHANGE UP
HGB BLD-MCNC: 13.8 G/DL — SIGNIFICANT CHANGE UP (ref 13–17)
IMM GRANULOCYTES NFR BLD AUTO: 0.1 % — SIGNIFICANT CHANGE UP (ref 0–0.9)
INR BLD: 1 RATIO — SIGNIFICANT CHANGE UP (ref 0.88–1.16)
INR BLD: 1.01 RATIO — SIGNIFICANT CHANGE UP (ref 0.88–1.16)
INR BLD: 1.07 RATIO — SIGNIFICANT CHANGE UP (ref 0.88–1.16)
LACTATE SERPL-SCNC: 1.1 MMOL/L — SIGNIFICANT CHANGE UP (ref 0.7–2)
LYMPHOCYTES # BLD AUTO: 0.96 K/UL — LOW (ref 1–3.3)
LYMPHOCYTES # BLD AUTO: 11.7 % — LOW (ref 13–44)
MCHC RBC-ENTMCNC: 29.2 PG — SIGNIFICANT CHANGE UP (ref 27–34)
MCHC RBC-ENTMCNC: 33.7 GM/DL — SIGNIFICANT CHANGE UP (ref 32–36)
MCV RBC AUTO: 86.9 FL — SIGNIFICANT CHANGE UP (ref 80–100)
MONOCYTES # BLD AUTO: 0.64 K/UL — SIGNIFICANT CHANGE UP (ref 0–0.9)
MONOCYTES NFR BLD AUTO: 7.8 % — SIGNIFICANT CHANGE UP (ref 2–14)
NEUTROPHILS # BLD AUTO: 6.55 K/UL — SIGNIFICANT CHANGE UP (ref 1.8–7.4)
NEUTROPHILS NFR BLD AUTO: 80.1 % — HIGH (ref 43–77)
NRBC # BLD: 0 /100 WBCS — SIGNIFICANT CHANGE UP (ref 0–0)
NT-PROBNP SERPL-SCNC: 172 PG/ML — HIGH (ref 0–125)
PLATELET # BLD AUTO: 151 K/UL — SIGNIFICANT CHANGE UP (ref 150–400)
POTASSIUM SERPL-MCNC: 3.7 MMOL/L — SIGNIFICANT CHANGE UP (ref 3.5–5.3)
POTASSIUM SERPL-SCNC: 3.7 MMOL/L — SIGNIFICANT CHANGE UP (ref 3.5–5.3)
PROCALCITONIN SERPL-MCNC: 0.07 NG/ML — SIGNIFICANT CHANGE UP (ref 0.02–0.1)
PROT SERPL-MCNC: 6.5 G/DL — SIGNIFICANT CHANGE UP (ref 6–8.3)
PROT SERPL-MCNC: 6.7 G/DL — SIGNIFICANT CHANGE UP (ref 6–8.3)
PROT SERPL-MCNC: 7 G/DL — SIGNIFICANT CHANGE UP (ref 6–8.3)
PROTHROM AB SERPL-ACNC: 11.9 SEC — SIGNIFICANT CHANGE UP (ref 10.5–13.4)
PROTHROM AB SERPL-ACNC: 12 SEC — SIGNIFICANT CHANGE UP (ref 10.5–13.4)
PROTHROM AB SERPL-ACNC: 12.7 SEC — SIGNIFICANT CHANGE UP (ref 10.5–13.4)
RBC # BLD: 4.72 M/UL — SIGNIFICANT CHANGE UP (ref 4.2–5.8)
RBC # FLD: 11.6 % — SIGNIFICANT CHANGE UP (ref 10.3–14.5)
S PNEUM AG UR QL: NEGATIVE — SIGNIFICANT CHANGE UP
SARS-COV-2 RNA SPEC QL NAA+PROBE: DETECTED
SODIUM SERPL-SCNC: 137 MMOL/L — SIGNIFICANT CHANGE UP (ref 135–145)
TROPONIN I, HIGH SENSITIVITY RESULT: 7.5 NG/L — SIGNIFICANT CHANGE UP
WBC # BLD: 8.19 K/UL — SIGNIFICANT CHANGE UP (ref 3.8–10.5)
WBC # FLD AUTO: 8.19 K/UL — SIGNIFICANT CHANGE UP (ref 3.8–10.5)

## 2023-07-11 PROCEDURE — 99239 HOSP IP/OBS DSCHRG MGMT >30: CPT

## 2023-07-11 PROCEDURE — 71045 X-RAY EXAM CHEST 1 VIEW: CPT | Mod: 26

## 2023-07-11 RX ORDER — ALBUTEROL 90 UG/1
2.5 AEROSOL, METERED ORAL ONCE
Refills: 0 | Status: COMPLETED | OUTPATIENT
Start: 2023-07-11 | End: 2023-07-11

## 2023-07-11 RX ORDER — GUAIFENESIN/DEXTROMETHORPHAN 600MG-30MG
10 TABLET, EXTENDED RELEASE 12 HR ORAL EVERY 6 HOURS
Refills: 0 | Status: DISCONTINUED | OUTPATIENT
Start: 2023-07-11 | End: 2023-07-12

## 2023-07-11 RX ORDER — PANTOPRAZOLE SODIUM 20 MG/1
40 TABLET, DELAYED RELEASE ORAL DAILY
Refills: 0 | Status: DISCONTINUED | OUTPATIENT
Start: 2023-07-11 | End: 2023-07-12

## 2023-07-11 RX ORDER — DEXAMETHASONE 0.5 MG/5ML
6 ELIXIR ORAL DAILY
Refills: 0 | Status: DISCONTINUED | OUTPATIENT
Start: 2023-07-11 | End: 2023-07-12

## 2023-07-11 RX ORDER — ONDANSETRON 8 MG/1
4 TABLET, FILM COATED ORAL EVERY 8 HOURS
Refills: 0 | Status: DISCONTINUED | OUTPATIENT
Start: 2023-07-11 | End: 2023-07-12

## 2023-07-11 RX ORDER — BACLOFEN 100 %
5 POWDER (GRAM) MISCELLANEOUS EVERY 8 HOURS
Refills: 0 | Status: DISCONTINUED | OUTPATIENT
Start: 2023-07-11 | End: 2023-07-11

## 2023-07-11 RX ORDER — LANOLIN ALCOHOL/MO/W.PET/CERES
3 CREAM (GRAM) TOPICAL AT BEDTIME
Refills: 0 | Status: DISCONTINUED | OUTPATIENT
Start: 2023-07-11 | End: 2023-07-12

## 2023-07-11 RX ORDER — CHLORPROMAZINE HCL 10 MG
12.5 TABLET ORAL
Refills: 0 | Status: DISCONTINUED | OUTPATIENT
Start: 2023-07-11 | End: 2023-07-12

## 2023-07-11 RX ORDER — REMDESIVIR 5 MG/ML
100 INJECTION INTRAVENOUS EVERY 24 HOURS
Refills: 0 | Status: DISCONTINUED | OUTPATIENT
Start: 2023-07-12 | End: 2023-07-12

## 2023-07-11 RX ORDER — REMDESIVIR 5 MG/ML
200 INJECTION INTRAVENOUS EVERY 24 HOURS
Refills: 0 | Status: COMPLETED | OUTPATIENT
Start: 2023-07-11 | End: 2023-07-11

## 2023-07-11 RX ORDER — ONDANSETRON 8 MG/1
4 TABLET, FILM COATED ORAL EVERY 8 HOURS
Refills: 0 | Status: DISCONTINUED | OUTPATIENT
Start: 2023-07-11 | End: 2023-07-11

## 2023-07-11 RX ORDER — REMDESIVIR 5 MG/ML
INJECTION INTRAVENOUS
Refills: 0 | Status: DISCONTINUED | OUTPATIENT
Start: 2023-07-11 | End: 2023-07-12

## 2023-07-11 RX ORDER — ENOXAPARIN SODIUM 100 MG/ML
40 INJECTION SUBCUTANEOUS EVERY 24 HOURS
Refills: 0 | Status: DISCONTINUED | OUTPATIENT
Start: 2023-07-11 | End: 2023-07-12

## 2023-07-11 RX ORDER — ACETAMINOPHEN 500 MG
650 TABLET ORAL EVERY 6 HOURS
Refills: 0 | Status: DISCONTINUED | OUTPATIENT
Start: 2023-07-11 | End: 2023-07-12

## 2023-07-11 RX ORDER — DEXAMETHASONE 0.5 MG/5ML
6 ELIXIR ORAL ONCE
Refills: 0 | Status: COMPLETED | OUTPATIENT
Start: 2023-07-11 | End: 2023-07-11

## 2023-07-11 RX ADMIN — REMDESIVIR 200 MILLIGRAM(S): 5 INJECTION INTRAVENOUS at 16:03

## 2023-07-11 RX ADMIN — Medication 3 MILLIGRAM(S): at 23:28

## 2023-07-11 RX ADMIN — Medication 10 MILLILITER(S): at 23:28

## 2023-07-11 RX ADMIN — ENOXAPARIN SODIUM 40 MILLIGRAM(S): 100 INJECTION SUBCUTANEOUS at 04:27

## 2023-07-11 RX ADMIN — Medication 6 MILLIGRAM(S): at 17:11

## 2023-07-11 RX ADMIN — Medication 6 MILLIGRAM(S): at 00:15

## 2023-07-11 RX ADMIN — Medication 10 MILLILITER(S): at 12:04

## 2023-07-11 RX ADMIN — Medication 15 MILLILITER(S): at 17:11

## 2023-07-11 RX ADMIN — Medication 100 MILLIGRAM(S): at 14:07

## 2023-07-11 RX ADMIN — Medication 12.5 MILLIGRAM(S): at 17:12

## 2023-07-11 RX ADMIN — Medication 10 MILLILITER(S): at 04:27

## 2023-07-11 RX ADMIN — PANTOPRAZOLE SODIUM 40 MILLIGRAM(S): 20 TABLET, DELAYED RELEASE ORAL at 12:04

## 2023-07-11 RX ADMIN — Medication 10 MILLILITER(S): at 17:11

## 2023-07-11 RX ADMIN — Medication 100 MILLIGRAM(S): at 05:37

## 2023-07-11 RX ADMIN — Medication 100 MILLIGRAM(S): at 22:57

## 2023-07-11 RX ADMIN — ALBUTEROL 2.5 MILLIGRAM(S): 90 AEROSOL, METERED ORAL at 00:16

## 2023-07-11 RX ADMIN — Medication 100 MILLIGRAM(S): at 04:26

## 2023-07-11 RX ADMIN — PANTOPRAZOLE SODIUM 40 MILLIGRAM(S): 20 TABLET, DELAYED RELEASE ORAL at 01:21

## 2023-07-11 NOTE — PATIENT PROFILE ADULT - FALL HARM RISK - UNIVERSAL INTERVENTIONS
Bed in lowest position, wheels locked, appropriate side rails in place/Call bell, personal items and telephone in reach/Instruct patient to call for assistance before getting out of bed or chair/Non-slip footwear when patient is out of bed/[place] to call system/Physically safe environment - no spills, clutter or unnecessary equipment/Purposeful Proactive Rounding/Room/bathroom lighting operational, light cord in reach

## 2023-07-11 NOTE — DISCHARGE NOTE PROVIDER - CARE PROVIDER_API CALL
Galileo Robison  Internal Medicine  [address], [address]  [place] [address]  Phone: [phone]  Fax: [phone]  Follow Up Time: 2 weeks

## 2023-07-11 NOTE — PROGRESS NOTE ADULT - ATTENDING COMMENTS
seen and examined  vsstable afebrile physical done   on bed comfortable has bouts of cough  and on and off hiccups    lungs, heart abd ok   cns non focal   labs noted   covid pneumonia  and hiccups   remdesivar , dexa  pulmonary   hiccups chlorpromazine    oob in chair   possible DC tomorrow

## 2023-07-11 NOTE — ED ADULT NURSE NOTE - NSFALLUNIVINTERV_ED_ALL_ED
Bed/Stretcher in lowest position, wheels locked, appropriate side rails in place/Call bell, personal items and telephone in reach/Instruct patient to call for assistance before getting out of bed/chair/stretcher/Non-slip footwear applied when patient is off stretcher/[employer] to call system/Physically safe environment - no spills, clutter or unnecessary equipment/Purposeful proactive rounding/Room/bathroom lighting operational, light cord in reach

## 2023-07-11 NOTE — ED PROVIDER NOTE - CARE PLAN
Principal Discharge DX:	Multilobar lung infiltrate  Secondary Diagnosis:	Pneumonia due to COVID-19 virus   1

## 2023-07-11 NOTE — ED ADULT NURSE NOTE - OBJECTIVE STATEMENT
Patient presented to the ED complaining of cough, difficulty breathing associated with constant hiccups since friday. Patient states the symptoms comes and goes.

## 2023-07-11 NOTE — PROGRESS NOTE ADULT - PROBLEM SELECTOR PLAN 2
Pt presenting with intractable hiccups.   Suspecting in the setting of GERD   Trial of PPI   if no improvement can try baclofen. Pt presenting with intractable hiccups.   Suspecting in the setting of GERD   s/p pantoprazole 40mg IV push,  c/w antacid (aluminum hydroxide/magnesium hydroxide/simethicone)   started on chlorpromazine 12.5mg bid  ondansetron prn

## 2023-07-11 NOTE — PROGRESS NOTE ADULT - SUBJECTIVE AND OBJECTIVE BOX
PGY-1 Progress Note discussed with attending    PAGER #: [[phone]] TILL 5:00 PM  PLEASE CONTACT ON CALL TEAM:  - On Call Team (Please refer to Zulema) FROM 5:00 PM - 8:30PM  - Nightfloat Team FROM 8:30 -7:30 AM    CHIEF COMPLAINT & BRIEF HOSPITAL COURSE:    INTERVAL HPI/OVERNIGHT EVENTS:       REVIEW OF SYSTEMS:  CONSTITUTIONAL: No fever, weight loss, or fatigue  RESPIRATORY: No cough, wheezing, chills or hemoptysis; No shortness of breath  CARDIOVASCULAR: No chest pain, palpitations, dizziness, or leg swelling  GASTROINTESTINAL: No abdominal pain. No nausea, vomiting, or hematemesis; No diarrhea or constipation. No melena or hematochezia.  GENITOURINARY: No dysuria or hematuria, urinary frequency  NEUROLOGICAL: No headaches, memory loss, loss of strength, numbness, or tremors  SKIN: No itching, burning, rashes, or lesions     Vital Signs Last 24 Hrs  T(C): 36.2 (11 Jul 2023 06:37), Max: 37 (11 Jul 2023 00:33)  T(F): 97.2 (11 Jul 2023 06:37), Max: 98.6 (11 Jul 2023 00:33)  HR: 72 (11 Jul 2023 06:37) (67 - 86)  BP: 112/73 (11 Jul 2023 06:37) (112/73 - 129/82)  BP(mean): --  RR: 18 (11 Jul 2023 06:37) (18 - 18)  SpO2: 99% (11 Jul 2023 06:37) (97% - 100%)    Parameters below as of 11 Jul 2023 06:37  Patient On (Oxygen Delivery Method): room air        PHYSICAL EXAMINATION:  GENERAL: NAD, well built  HEAD:  Atraumatic, Normocephalic  EYES:  conjunctiva and sclera clear  NECK: Supple, No JVD, Normal thyroid  CHEST/LUNG: Clear to auscultation. Clear to percussion bilaterally; No rales, rhonchi, wheezing, or rubs  HEART: Regular rate and rhythm; No murmurs, rubs, or gallops  ABDOMEN: Soft, Nontender, Nondistended; Bowel sounds present  NERVOUS SYSTEM:  Alert & Oriented X3,    EXTREMITIES:  2+ Peripheral Pulses, No clubbing, cyanosis, or edema  SKIN: warm dry                          13.8   8.19  )-----------( 151      ( 10 Jul 2023 23:30 )             41.0     07-10    137  |  103  |  12  ----------------------------<  96  3.7   |  26  |  0.88    Ca    8.2<L>      10 Jul 2023 23:30  Mg     1.9     07-09    TPro  6.7  /  Alb  3.4<L>  /  TBili  0.5  /  DBili  x   /  AST  46<H>  /  ALT  33  /  AlkPhos  55  07-10    LIVER FUNCTIONS - ( 10 Jul 2023 23:30 )  Alb: 3.4 g/dL / Pro: 6.7 g/dL / ALK PHOS: 55 U/L / ALT: 33 U/L DA / AST: 46 U/L / GGT: x               PT/INR - ( 10 Jul 2023 23:30 )   PT: 12.0 sec;   INR: 1.01 ratio         PTT - ( 10 Jul 2023 23:30 )  PTT:28.2 sec    CAPILLARY BLOOD GLUCOSE      RADIOLOGY & ADDITIONAL TESTS:                   PGY-1 Progress Note discussed with attending    PAGER #: [[phone]] TILL 5:00 PM  PLEASE CONTACT ON CALL TEAM:  - On Call Team (Please refer to Zulema) FROM 5:00 PM - 8:30PM  - Nightfloat Team FROM 8:30 -7:30 AM    CHIEF COMPLAINT & BRIEF HOSPITAL COURSE: Pt admitted for intractable hiccups that began with his coughing. Pt tested positive for Covid 7/8/23.    INTERVAL HPI/OVERNIGHT EVENTS: Pt says his hiccups were better overnight but began again after breakfast. Whenever he has hiccups, triggered by eating or coughs, he complains of SOB. He denies fever, dizziness or other physical complaints       REVIEW OF SYSTEMS:  CONSTITUTIONAL: No fever, weight loss, or fatigue  RESPIRATORY: (+) cough and intermittent SOB with hiccups; No wheezing, chills or hemoptysis;  CARDIOVASCULAR: No chest pain, palpitations, dizziness, or leg swelling  GASTROINTESTINAL: No abdominal pain. No nausea, vomiting, or hematemesis; No diarrhea or constipation. No melena or hematochezia.  GENITOURINARY: No dysuria or hematuria, urinary frequency  NEUROLOGICAL: No headaches, memory loss, loss of strength, numbness, or tremors  SKIN: No itching, burning, rashes, or lesions     Vital Signs Last 24 Hrs  T(C): 36.2 (11 Jul 2023 06:37), Max: 37 (11 Jul 2023 00:33)  T(F): 97.2 (11 Jul 2023 06:37), Max: 98.6 (11 Jul 2023 00:33)  HR: 72 (11 Jul 2023 06:37) (67 - 86)  BP: 112/73 (11 Jul 2023 06:37) (112/73 - 129/82)  BP(mean): --  RR: 18 (11 Jul 2023 06:37) (18 - 18)  SpO2: 99% (11 Jul 2023 06:37) (97% - 100%)    Parameters below as of 11 Jul 2023 06:37  Patient On (Oxygen Delivery Method): room air        PHYSICAL EXAMINATION:  GENERAL: NAD, well built  HEAD:  Atraumatic, Normocephalic  EYES:  conjunctiva and sclera clear  NECK: Supple, No JVD, Normal thyroid  CHEST/LUNG: Clear to auscultation. Clear to percussion bilaterally; No rales, rhonchi, wheezing, or rubs  HEART: Regular rate and rhythm; No murmurs, rubs, or gallops  ABDOMEN: Soft, Nontender, Nondistended; Bowel sounds present  NERVOUS SYSTEM:  Alert & Oriented X3,    EXTREMITIES:  2+ Peripheral Pulses, No clubbing, cyanosis, or edema  SKIN: warm dry                          13.8   8.19  )-----------( 151      ( 10 Jul 2023 23:30 )             41.0     07-10    137  |  103  |  12  ----------------------------<  96  3.7   |  26  |  0.88    Ca    8.2<L>      10 Jul 2023 23:30  Mg     1.9     07-09    TPro  6.7  /  Alb  3.4<L>  /  TBili  0.5  /  DBili  x   /  AST  46<H>  /  ALT  33  /  AlkPhos  55  07-10    LIVER FUNCTIONS - ( 10 Jul 2023 23:30 )  Alb: 3.4 g/dL / Pro: 6.7 g/dL / ALK PHOS: 55 U/L / ALT: 33 U/L DA / AST: 46 U/L / GGT: x               PT/INR - ( 10 Jul 2023 23:30 )   PT: 12.0 sec;   INR: 1.01 ratio         PTT - ( 10 Jul 2023 23:30 )  PTT:28.2 sec    CAPILLARY BLOOD GLUCOSE      RADIOLOGY & ADDITIONAL TESTS:

## 2023-07-11 NOTE — PATIENT PROFILE ADULT - OVER THE PAST TWO WEEKS, HAVE YOU FELT LITTLE INTEREST OR PLEASURE IN DOING THINGS?
After Visit Summary   2018    Cordell Mcfarland    MRN: [id number]           Patient Information     Date Of Birth          1990        Visit Information        Provider Department      2018 9:30 AM Catina Joseph, Rhonda OSEGUERA [facility]        Today's Diagnoses     Conductive hearing loss of right ear with unrestricted hearing of left ear    -  1    Cholesteatoma           Follow-ups after your visit        Who to contact     Please call your clinic at [phone] to:    Ask questions about your health    Make or cancel appointments    Discuss your medicines    Learn about your test results    Speak to your doctor            Additional Information About Your Visit        MyChart Information     [employer] is an electronic gateway that provides easy, online access to your medical records. With [employer] TV, you can request a clinic appointment, read your test results, renew a prescription or communicate with your care team.     To sign up for [employer] TV visit the website at [email or web address]   You will be asked to enter the access code listed below, as well as some personal information. Please follow the directions to create your username and password.     Your access code is: [email or web address]  Expires: 3/18/2018  9:38 AM     Your access code will  in 90 days. If you need help or a new code, please contact your [facility] or call [phone] for assistance.        Care EveryWhere ID     This is your Care EveryWhere ID. This could be used by other organizations to access your [facility] medical records  [id number]         Blood Pressure from Last 3 Encounters:   17 111/67   17 119/61   17 113/79    Weight from Last 3 Encounters:   18 106.6 kg (235 lb)   18 106.6 kg (235 lb)   17 106.6 kg (235 lb)              We Performed the Following     AUDIOGRAM/TYMPANOGRAM - INTERFACE     AUDIOLOGY ADULT REFERRAL     [facility]  [facility] Speech Recog ([address]41)     Tympanometry (impedance - testing) (65209)        Primary Care Provider Office Phone # Fax #    Alma Quinteros PA-C [phone]       [address]  [facility] [address]        Equal Access to Services     [facility] Hospital[facility] : Hadii aad ku hadasho Soomaali, waaxda luqadaha, qaybta kaalmada adeegyada, waxay idiin hayaan adeeg kharash la'aan . So [facility] [phone].    ATENCIÓN: Si habla español, tiene a pope disposición servicios gratuitos de asistencia lingüística. LlKettering [facility] [phone].    We comply with applicable federal civil rights laws and [place] laws. We do not discriminate on the basis of race, color, national origin, age, disability, sex, sexual orientation, or gender identity.            Thank you!     Thank you for choosing [facility] AUDIOLOGY  for your care. Our goal is always to provide you with excellent care. Hearing back from our patients is one way we can continue to improve our services. Please take a few minutes to complete the written survey that you may receive in the mail after your visit with us. Thank you!             Your Updated Medication List - Protect others around you: Learn how to safely use, store and throw away your medicines at www.disposemymeds.org.          This list is accurate as of 2/26/18  3:04 PM.  Always use your most recent med list.                   Brand Name Dispense Instructions for use Diagnosis    HYDROcodone-acetaminophen 5-325 MG per tablet    NORCO    8 tablet    Take 1 tablet by mouth every 6 hours as needed for other (Moderate to Severe Pain)    S/P ear surgery       ofloxacin 0.3 % otic solution    FLOXIN    7 mL    Place 5 drops into the right ear 2 times daily    S/P ear surgery          no

## 2023-07-11 NOTE — PROGRESS NOTE ADULT - ASSESSMENT
This is a 62 year old male, coming from home, with no medical history being admitted for covid and intractable hiccups.  This is a 62 year old male, coming from home, with no medical history being admitted for Covid and intractable hiccups.

## 2023-07-11 NOTE — DISCHARGE NOTE PROVIDER - NSDCCPCAREPLAN_GEN_ALL_CORE_FT
PRINCIPAL DISCHARGE DIAGNOSIS  Diagnosis: Pneumonia due to COVID-19 virus  Assessment and Plan of Treatment: You presented to the hospital complaining of shortness of breath.      SECONDARY DISCHARGE DIAGNOSES  Diagnosis: Intractable hiccups  Assessment and Plan of Treatment:     Diagnosis: Pneumonia due to COVID-19 virus  Assessment and Plan of Treatment:      PRINCIPAL DISCHARGE DIAGNOSIS  Diagnosis: Pneumonia due to COVID-19 virus  Assessment and Plan of Treatment: You presented to the hospital complaining of shortness of breath. You were found to be COVID postitive. You had a CT scan of the chest that showed some inflammtory process, you were seen by a pulmonologist. Your had pneumonia work up done.  You were given steroids and antivirla medication to help alleviate your symptoms. Please follow up with your PCP within one week of discharge for further assessment.      SECONDARY DISCHARGE DIAGNOSES  Diagnosis: Intractable hiccups  Assessment and Plan of Treatment: You were having intractable hiccups, You were given pantoprazole and chlorpromazine to help improve your symptoms. Your cough will take time to resovled. Please follow up with PCP within one week discharge.     PRINCIPAL DISCHARGE DIAGNOSIS  Diagnosis: Pneumonia due to COVID-19 virus  Assessment and Plan of Treatment: You presented to the hospital complaining of shortness of breath. You were found to be COVID postitive. You had a CT scan of the chest that showed some inflammtory process, you were seen by a pulmonologist. Your had pneumonia work up done.  You were given steroids and antiviral IV medication to help alleviate your symptoms. PLease complete course of PAXLOVID AT HOME, AND TAKE PREDNISONE 40MG FOR 5 DAYS, and Robitussin as needed for cough. Please follow up with your PCP within one week of discharge for further assessment.      SECONDARY DISCHARGE DIAGNOSES  Diagnosis: Intractable hiccups  Assessment and Plan of Treatment: You were having intractable hiccups, You were given pantoprazole and chlorpromazine to help improve your symptoms. Your cough will take time to resovled. PLEASE TAKE CHLOROPROMAZINE FOR THREE DAYS. Please follow up with PCP within one week discharge.

## 2023-07-11 NOTE — DISCHARGE NOTE PROVIDER - HOSPITAL COURSE
This is a 62 year old male, coming from home, with no medical history being admitted for covid and intractable hiccups.        Problem/Plan - 1:  ·  Problem: 2019 novel coronavirus disease (COVID-19).   ·  Plan: saturating well on room air  holding remdesivir x5d + Decadron x10 days as pt doesn't have symptoms.   Obtain ambulatory O2 and document  Tylenol prn, tessalon perles, albuterol q6  incentive spirometer  CT: Confluent ground-glass opacity in the left upper lobe lingula segment, left lower lobe and middle lobe compatible with multifocal pneumonia which may be of nonbacterial origin.  Maintain O2 92-95%, o2 support titrate as needed.    Intractable hiccups.   ·  Plan: Pt presenting with intractable hiccups.   Suspecting in the setting of GERD   Trial of PPI   if no improvement can try baclofen.     This is a 62 year old male, coming from home, with no medical history being admitted for covid and intractable hiccups.     2019 novel coronavirus disease (COVID-19)  Patient complaining of shortness of breath. Started remdesivir for two doses and decadron. CT chest showing ground-glass opacity in the left upper lobe lingula segment, left lower lobe and middle lobe. Seen by pulmonology Dr. Tena   Maintain O2 92-95%, o2 support titrate as needed.    Intractable hiccups   ·  Plan: Pt presenting with intractable hiccups.   Suspecting in the setting of GERD   Trial of PPI   if no improvement can try baclofen.     This is a 62 year old male, coming from home, with no medical history being admitted for covid and intractable hiccups.     2019 novel coronavirus disease (COVID-19)  Patient complaining of shortness of breath. Started remdesivir for two doses and decadron. CT chest showing ground-glass opacity in the left upper lobe lingula segment, left lower lobe and middle lobe. Seen by pulmonology Dr. Tena. Sputum cultures, legionella, and mycoplasma, blood cultures XXXX.     Intractable hiccups   Patient presenting with intractable hiccups. Started on a trial of pantoprazole and promethazine     Patient is able to ambulate and tolerate diet prior to discharge. Patient is stable for discharge per attending and is advised to follow up with PCP as outpatient. Please refer to patient's complete medical chart with documents for a full hospital course, for this is only a brief summary.     This is a 62 year old male, coming from home, with no medical history being admitted for covid and intractable hiccups.     2019 novel coronavirus disease (COVID-19)  Patient complaining of shortness of breath. Started remdesivir for two doses and decadron. CT chest showing ground-glass opacity in the left upper lobe lingula segment, left lower lobe and middle lobe. legionella, and mycoplasma, blood cultures were negative. Patient will be discharged on prednisone 40 mg for 5 days, and paxlovid.     Intractable hiccups   Patient presenting with intractable hiccups. Started on a trial of pantoprazole and promethazine . Will be discharged on promethazine for three days.     Patient is able to ambulate and tolerate diet prior to discharge. Patient is stable for discharge per attending and is advised to follow up with PCP as outpatient. Please refer to patient's complete medical chart with documents for a full hospital course, for this is only a brief summary.

## 2023-07-11 NOTE — ED PROVIDER NOTE - CLINICAL SUMMARY MEDICAL DECISION MAKING FREE TEXT BOX
62-year-old male with no past medical history and recently diagnosed COVID presents with shortness of breath, cough and hiccups.  ekg interpretation- junctional rhythm  lab interpretation- wbc wnl  imaging interpretation- CXR pending  Discussed above with Dr. Robison who recommends 24hours admission.  Discussed above with patient. Given decadron and albuterol neb  patient stable for admission 62-year-old male with no past medical history and recently diagnosed COVID presents with shortness of breath, cough and hiccups.  ekg interpretation- junctional rhythm  lab interpretation- wbc wnl, cmp wnl, trop wnl  imaging interpretation- pt refused CXR  Discussed above with Dr. Robison who recommends 24hours admission.  Discussed above with patient. Given decadron and albuterol neb  patient stable for admission

## 2023-07-11 NOTE — ED PROVIDER NOTE - OBJECTIVE STATEMENT
62-year-old male with no past medical history and recently diagnosed COVID presents with shortness of breath, cough and hiccups.  Patient was seen in the ED 2 days ago and tested positive for COVID went home but started vomiting return to the ED yesterday due to severe cough CT showed multilobar pneumonia but since cough improved during his evaluation in the ED was discharged home.  Reports that earlier today he was seen by his PMD who prescribed them Paxlovid patient was unable to get prescriptions due to insurance issues.  Reports that tonight his shortness of breath worsened thus decided to come to the ED for evaluation.  Denies new fever or new sputum production.  Denies recurrence of vomiting.

## 2023-07-11 NOTE — DISCHARGE NOTE PROVIDER - NSDCMRMEDTOKEN_GEN_ALL_CORE_FT
chlorproMAZINE 25 mg oral tablet: 0.5 tab(s) orally 2 times a day  guaiFENesin-dextromethorphan 100 mg-10 mg/5 mL oral liquid: 10 milliliter(s) orally every 6 hours  predniSONE 20 mg oral tablet: 2 tab(s) orally once a day

## 2023-07-11 NOTE — PROGRESS NOTE ADULT - PROBLEM SELECTOR PLAN 1
saturating well on room air  holding remdesivir x5d + Decadron x10 days as pt doesn't have symptoms.   Obtain ambulatory O2 and document  Tylenol prn, tessalon perles, albuterol q6  incentive spirometer  CT: Confluent ground-glass opacity in the left upper lobe lingula segment, left lower lobe and middle lobe compatible with multifocal pneumonia which may be of nonbacterial origin.  Maintain O2 92-95%, o2 support titrate as needed. saturating well on room air  CT: Confluent ground-glass opacity in the left upper lobe lingula segment, left lower lobe and middle lobe compatible with multifocal pneumonia which may be of nonbacterial origin.  Dexamethasone 6mg IV qd, remdesivir   robitussin syrup, benzonatate 100mg tid, Tylenol prn  incentive spirometer    Maintain O2 92-95%, o2 support titrate as needed.

## 2023-07-12 ENCOUNTER — TRANSCRIPTION ENCOUNTER (OUTPATIENT)
Age: 63
End: 2023-07-12

## 2023-07-12 VITALS
HEART RATE: 82 BPM | OXYGEN SATURATION: 96 % | DIASTOLIC BLOOD PRESSURE: 78 MMHG | SYSTOLIC BLOOD PRESSURE: 107 MMHG | TEMPERATURE: 97 F | RESPIRATION RATE: 18 BRPM

## 2023-07-12 LAB
A1C WITH ESTIMATED AVERAGE GLUCOSE RESULT: 5.5 % — SIGNIFICANT CHANGE UP (ref 4–5.6)
ALBUMIN SERPL ELPH-MCNC: 2.7 G/DL — LOW (ref 3.5–5)
ALP SERPL-CCNC: 51 U/L — SIGNIFICANT CHANGE UP (ref 40–120)
ALT FLD-CCNC: 38 U/L DA — SIGNIFICANT CHANGE UP (ref 10–60)
ANION GAP SERPL CALC-SCNC: 9 MMOL/L — SIGNIFICANT CHANGE UP (ref 5–17)
AST SERPL-CCNC: 39 U/L — SIGNIFICANT CHANGE UP (ref 10–40)
BILIRUB DIRECT SERPL-MCNC: 0.1 MG/DL — SIGNIFICANT CHANGE UP (ref 0–0.3)
BILIRUB INDIRECT FLD-MCNC: 0.3 MG/DL — SIGNIFICANT CHANGE UP (ref 0.2–1)
BILIRUB SERPL-MCNC: 0.4 MG/DL — SIGNIFICANT CHANGE UP (ref 0.2–1.2)
BUN SERPL-MCNC: 13 MG/DL — SIGNIFICANT CHANGE UP (ref 7–18)
CALCIUM SERPL-MCNC: 8.2 MG/DL — LOW (ref 8.4–10.5)
CHLORIDE SERPL-SCNC: 107 MMOL/L — SIGNIFICANT CHANGE UP (ref 96–108)
CO2 SERPL-SCNC: 23 MMOL/L — SIGNIFICANT CHANGE UP (ref 22–31)
CREAT SERPL-MCNC: 0.78 MG/DL — SIGNIFICANT CHANGE UP (ref 0.5–1.3)
EGFR: 100 ML/MIN/1.73M2 — SIGNIFICANT CHANGE UP
ESTIMATED AVERAGE GLUCOSE: 111 MG/DL — SIGNIFICANT CHANGE UP (ref 68–114)
GLUCOSE SERPL-MCNC: 127 MG/DL — HIGH (ref 70–99)
HCT VFR BLD CALC: 39.5 % — SIGNIFICANT CHANGE UP (ref 39–50)
HGB BLD-MCNC: 13.5 G/DL — SIGNIFICANT CHANGE UP (ref 13–17)
INR BLD: 1.02 RATIO — SIGNIFICANT CHANGE UP (ref 0.88–1.16)
LEGIONELLA AG UR QL: NEGATIVE — SIGNIFICANT CHANGE UP
MCHC RBC-ENTMCNC: 29.5 PG — SIGNIFICANT CHANGE UP (ref 27–34)
MCHC RBC-ENTMCNC: 34.2 GM/DL — SIGNIFICANT CHANGE UP (ref 32–36)
MCV RBC AUTO: 86.2 FL — SIGNIFICANT CHANGE UP (ref 80–100)
NRBC # BLD: 0 /100 WBCS — SIGNIFICANT CHANGE UP (ref 0–0)
PLATELET # BLD AUTO: 157 K/UL — SIGNIFICANT CHANGE UP (ref 150–400)
POTASSIUM SERPL-MCNC: 3.8 MMOL/L — SIGNIFICANT CHANGE UP (ref 3.5–5.3)
POTASSIUM SERPL-SCNC: 3.8 MMOL/L — SIGNIFICANT CHANGE UP (ref 3.5–5.3)
PROT SERPL-MCNC: 6.2 G/DL — SIGNIFICANT CHANGE UP (ref 6–8.3)
PROTHROM AB SERPL-ACNC: 12.1 SEC — SIGNIFICANT CHANGE UP (ref 10.5–13.4)
RBC # BLD: 4.58 M/UL — SIGNIFICANT CHANGE UP (ref 4.2–5.8)
RBC # FLD: 11.8 % — SIGNIFICANT CHANGE UP (ref 10.3–14.5)
SODIUM SERPL-SCNC: 139 MMOL/L — SIGNIFICANT CHANGE UP (ref 135–145)
TSH SERPL-MCNC: 0.37 UU/ML — SIGNIFICANT CHANGE UP (ref 0.34–4.82)
WBC # BLD: 7.74 K/UL — SIGNIFICANT CHANGE UP (ref 3.8–10.5)
WBC # FLD AUTO: 7.74 K/UL — SIGNIFICANT CHANGE UP (ref 3.8–10.5)

## 2023-07-12 PROCEDURE — 80076 HEPATIC FUNCTION PANEL: CPT

## 2023-07-12 PROCEDURE — 82728 ASSAY OF FERRITIN: CPT

## 2023-07-12 PROCEDURE — 96375 TX/PRO/DX INJ NEW DRUG ADDON: CPT | Mod: XU

## 2023-07-12 PROCEDURE — 86140 C-REACTIVE PROTEIN: CPT

## 2023-07-12 PROCEDURE — 80053 COMPREHEN METABOLIC PANEL: CPT

## 2023-07-12 PROCEDURE — 85379 FIBRIN DEGRADATION QUANT: CPT

## 2023-07-12 PROCEDURE — 93005 ELECTROCARDIOGRAM TRACING: CPT

## 2023-07-12 PROCEDURE — 87637 SARSCOV2&INF A&B&RSV AMP PRB: CPT

## 2023-07-12 PROCEDURE — 83735 ASSAY OF MAGNESIUM: CPT

## 2023-07-12 PROCEDURE — 85730 THROMBOPLASTIN TIME PARTIAL: CPT

## 2023-07-12 PROCEDURE — 87635 SARS-COV-2 COVID-19 AMP PRB: CPT

## 2023-07-12 PROCEDURE — 83605 ASSAY OF LACTIC ACID: CPT

## 2023-07-12 PROCEDURE — 94640 AIRWAY INHALATION TREATMENT: CPT

## 2023-07-12 PROCEDURE — 83880 ASSAY OF NATRIURETIC PEPTIDE: CPT

## 2023-07-12 PROCEDURE — 84484 ASSAY OF TROPONIN QUANT: CPT

## 2023-07-12 PROCEDURE — 80048 BASIC METABOLIC PNL TOTAL CA: CPT

## 2023-07-12 PROCEDURE — 87040 BLOOD CULTURE FOR BACTERIA: CPT

## 2023-07-12 PROCEDURE — 85025 COMPLETE CBC W/AUTO DIFF WBC: CPT

## 2023-07-12 PROCEDURE — 86703 HIV-1/HIV-2 1 RESULT ANTBDY: CPT

## 2023-07-12 PROCEDURE — 83690 ASSAY OF LIPASE: CPT

## 2023-07-12 PROCEDURE — 99284 EMERGENCY DEPT VISIT MOD MDM: CPT | Mod: 25

## 2023-07-12 PROCEDURE — 99199 UNLISTED SPECIAL SVC PX/RPRT: CPT

## 2023-07-12 PROCEDURE — G1004: CPT

## 2023-07-12 PROCEDURE — 71260 CT THORAX DX C+: CPT | Mod: MG

## 2023-07-12 PROCEDURE — 85610 PROTHROMBIN TIME: CPT

## 2023-07-12 PROCEDURE — 71045 X-RAY EXAM CHEST 1 VIEW: CPT

## 2023-07-12 PROCEDURE — 83036 HEMOGLOBIN GLYCOSYLATED A1C: CPT

## 2023-07-12 PROCEDURE — 82565 ASSAY OF CREATININE: CPT

## 2023-07-12 PROCEDURE — 85027 COMPLETE CBC AUTOMATED: CPT

## 2023-07-12 PROCEDURE — 36415 COLL VENOUS BLD VENIPUNCTURE: CPT

## 2023-07-12 PROCEDURE — 86803 HEPATITIS C AB TEST: CPT

## 2023-07-12 PROCEDURE — 87449 NOS EACH ORGANISM AG IA: CPT

## 2023-07-12 PROCEDURE — 84443 ASSAY THYROID STIM HORMONE: CPT

## 2023-07-12 PROCEDURE — 84145 PROCALCITONIN (PCT): CPT

## 2023-07-12 PROCEDURE — 96374 THER/PROPH/DIAG INJ IV PUSH: CPT

## 2023-07-12 PROCEDURE — 99285 EMERGENCY DEPT VISIT HI MDM: CPT | Mod: 25

## 2023-07-12 PROCEDURE — 71046 X-RAY EXAM CHEST 2 VIEWS: CPT

## 2023-07-12 PROCEDURE — 87899 AGENT NOS ASSAY W/OPTIC: CPT

## 2023-07-12 PROCEDURE — 86738 MYCOPLASMA ANTIBODY: CPT

## 2023-07-12 RX ORDER — ALBUTEROL 90 UG/1
2 AEROSOL, METERED ORAL EVERY 6 HOURS
Refills: 0 | Status: DISCONTINUED | OUTPATIENT
Start: 2023-07-12 | End: 2023-07-12

## 2023-07-12 RX ORDER — GUAIFENESIN/DEXTROMETHORPHAN 600MG-30MG
10 TABLET, EXTENDED RELEASE 12 HR ORAL
Qty: 120 | Refills: 0
Start: 2023-07-12 | End: 2023-07-14

## 2023-07-12 RX ORDER — CHLORPROMAZINE HCL 10 MG
0.5 TABLET ORAL
Qty: 3 | Refills: 0
Start: 2023-07-12 | End: 2023-07-14

## 2023-07-12 RX ADMIN — Medication 6 MILLIGRAM(S): at 05:07

## 2023-07-12 RX ADMIN — Medication 100 MILLIGRAM(S): at 10:22

## 2023-07-12 RX ADMIN — Medication 15 MILLILITER(S): at 05:06

## 2023-07-12 RX ADMIN — Medication 10 MILLILITER(S): at 05:06

## 2023-07-12 RX ADMIN — Medication 100 MILLIGRAM(S): at 15:03

## 2023-07-12 RX ADMIN — PANTOPRAZOLE SODIUM 40 MILLIGRAM(S): 20 TABLET, DELAYED RELEASE ORAL at 12:09

## 2023-07-12 RX ADMIN — Medication 100 MILLIGRAM(S): at 05:06

## 2023-07-12 RX ADMIN — REMDESIVIR 200 MILLIGRAM(S): 5 INJECTION INTRAVENOUS at 15:53

## 2023-07-12 RX ADMIN — ENOXAPARIN SODIUM 40 MILLIGRAM(S): 100 INJECTION SUBCUTANEOUS at 05:07

## 2023-07-12 RX ADMIN — Medication 12.5 MILLIGRAM(S): at 05:06

## 2023-07-12 NOTE — PROGRESS NOTE ADULT - SUBJECTIVE AND OBJECTIVE BOX
HPI:  This is a 62 year old male, coming from home, with no medical history coming in with hiccups along with shortness of breath. Pt states this has ongoing since Thursday he has come to the ED multiple times for the complaints. He was found to have COVID, he also has been expereincing shortness of breath along with body aches headaches and chills at home. He also states when he has the bouts of hiccups they occur in 5's and he has a hard time breathing after for a few seconds. He denies any  visual disturbances, N/V/D, dizziness, falls, chest pain, palpitations, lower extremity swelling, skin rash, recent travel, or sick contacts   (10 Jul 2023 23:52)      Patient is a 62y old  Male who presents with a chief complaint of Hiccups (12 Jul 2023 11:40)      INTERVAL HPI/OVERNIGHT EVENTS:  T(C): 36.2 (07-12-23 @ 11:25), Max: 36.9 (07-11-23 @ 20:49)  HR: 82 (07-12-23 @ 11:25) (64 - 82)  BP: 107/78 (07-12-23 @ 11:25) (107/78 - 112/69)  RR: 18 (07-12-23 @ 11:25) (18 - 18)  SpO2: 96% (07-12-23 @ 11:25) (96% - 98%)  Wt(kg): --  I&O's Summary      REVIEW OF SYSTEMS: denies fever, chills, SOB, palpitations, chest pain, abdominal pain, nausea, vomitting, diarrhea, constipation, dizziness    MEDICATIONS  (STANDING):  aluminum hydroxide/magnesium hydroxide/simethicone Suspension 15 milliLiter(s) Oral every 12 hours  benzonatate 100 milliGRAM(s) Oral three times a day  chlorproMAZINE    Tablet 12.5 milliGRAM(s) Oral two times a day  dexAMETHasone  Injectable 6 milliGRAM(s) IV Push daily  enoxaparin Injectable 40 milliGRAM(s) SubCutaneous every 24 hours  guaifenesin/dextromethorphan Oral Liquid 10 milliLiter(s) Oral every 6 hours  pantoprazole  Injectable 40 milliGRAM(s) IV Push daily  remdesivir  IVPB 100 milliGRAM(s) IV Intermittent every 24 hours  remdesivir  IVPB   IV Intermittent     MEDICATIONS  (PRN):  acetaminophen     Tablet .. 650 milliGRAM(s) Oral every 6 hours PRN Temp greater or equal to 38C (100.4F), Mild Pain (1 - 3)  albuterol    90 MICROgram(s) HFA Inhaler 2 Puff(s) Inhalation every 6 hours PRN Shortness of Breath and/or Wheezing  melatonin 3 milliGRAM(s) Oral at bedtime PRN Insomnia  ondansetron Injectable 4 milliGRAM(s) IV Push every 8 hours PRN Nausea and/or Vomiting      PHYSICAL EXAM:  GENERAL: NAD, well-groomed, well-developed  HEAD:  Atraumatic, Normocephalic  EYES: EOMI, PERRLA, conjunctiva and sclera clear  ENMT: No tonsillar erythema, exudates, or enlargement; Moist mucous membranes, Good dentition, No lesions  NECK: Supple, No JVD, Normal thyroid  NERVOUS SYSTEM:  Alert & Oriented X3, Good concentration; Motor Strength 5/5 B/L upper and lower extremities; DTRs 2+ intact and symmetric  CHEST/LUNG: Clear to percussion bilaterally; No rales, rhonchi, wheezing, or rubs  HEART: Regular rate and rhythm; No murmurs, rubs, or gallops  ABDOMEN: Soft, Nontender, Nondistended; Bowel sounds present  EXTREMITIES:  2+ Peripheral Pulses, No clubbing, cyanosis, or edema  LYMPH: No lymphadenopathy noted  SKIN: No rashes or lesions  LABS:                        13.5   7.74  )-----------( 157      ( 12 Jul 2023 06:20 )             39.5     07-12    139  |  107  |  13  ----------------------------<  127<H>  3.8   |  23  |  0.78    Ca    8.2<L>      12 Jul 2023 06:20    TPro  6.2  /  Alb  2.7<L>  /  TBili  0.4  /  DBili  0.1  /  AST  39  /  ALT  38  /  AlkPhos  51  07-12    PT/INR - ( 12 Jul 2023 06:20 )   PT: 12.1 sec;   INR: 1.02 ratio         PTT - ( 10 Jul 2023 23:30 )  PTT:28.2 sec  Urinalysis Basic - ( 12 Jul 2023 06:20 )    Color: x / Appearance: x / SG: x / pH: x  Gluc: 127 mg/dL / Ketone: x  / Bili: x / Urobili: x   Blood: x / Protein: x / Nitrite: x   Leuk Esterase: x / RBC: x / WBC x   Sq Epi: x / Non Sq Epi: x / Bacteria: x      CAPILLARY BLOOD GLUCOSE            Urinalysis Basic - ( 12 Jul 2023 06:20 )    Color: x / Appearance: x / SG: x / pH: x  Gluc: 127 mg/dL / Ketone: x  / Bili: x / Urobili: x   Blood: x / Protein: x / Nitrite: x   Leuk Esterase: x / RBC: x / WBC x   Sq Epi: x / Non Sq Epi: x / Bacteria: x

## 2023-07-12 NOTE — DISCHARGE NOTE NURSING/CASE MANAGEMENT/SOCIAL WORK - PATIENT PORTAL LINK FT
You can access the FollowMyHealth Patient Portal offered by [facility] by registering at the following website: [email or web address]. By joining [employer]’s FollowMyHealth portal, you will also be able to view your health information using other applications (apps) compatible with our system.

## 2023-07-12 NOTE — DISCHARGE NOTE NURSING/CASE MANAGEMENT/SOCIAL WORK - NSDCPEFALRISK_GEN_ALL_CORE
For information on Fall & Injury Prevention, visit: [email or web address] OR  [email or web address] OR  https://www.cdc.gov/steadi/patient.html

## 2023-07-12 NOTE — PROGRESS NOTE ADULT - SUBJECTIVE AND OBJECTIVE BOX
PGY-1 Progress Note discussed with attending    PAGER #: [phone]] TILL 5:00 PM  PLEASE CONTACT ON CALL TEAM:  - On Call Team (Please refer to Zulema) FROM 5:00 PM - 8:30PM  - Nightfloat Team FROM 8:30 -7:30 AM    INTERVAL HPI/OVERNIGHT EVENTS: No acute events overnight.  Patient examined at bedside this AM.  Patient denies acute complaints, states shortness of breath has resolved. Still endorses cough and hiccups improved        REVIEW OF SYSTEMS: negative other than HPI.       Vital Signs Last 24 Hrs  T(C): 36.2 (12 Jul 2023 11:25), Max: 36.9 (11 Jul 2023 20:49)  T(F): 97.2 (12 Jul 2023 11:25), Max: 98.4 (11 Jul 2023 20:49)  HR: 82 (12 Jul 2023 11:25) (64 - 82)  BP: 107/78 (12 Jul 2023 11:25) (107/78 - 112/69)  BP(mean): 77 (12 Jul 2023 05:39) (77 - 77)  RR: 18 (12 Jul 2023 11:25) (18 - 18)  SpO2: 96% (12 Jul 2023 11:25) (96% - 98%)    Parameters below as of 12 Jul 2023 11:25  Patient On (Oxygen Delivery Method): room air        PHYSICAL EXAMINATION:  GENERAL: NAD, well built  HEAD:  Atraumatic, Normocephalic  EYES:  conjunctiva and sclera clear  NECK: Supple, No JVD, Normal thyroid  CHEST/LUNG: Clear to auscultation. Clear to percussion bilaterally; No rales, rhonchi, wheezing, or rubs  HEART: Regular rate and rhythm; No murmurs, rubs, or gallops  ABDOMEN: Soft, Nontender, Nondistended; Bowel sounds present  NERVOUS SYSTEM:  Alert & Oriented X3,  EXTREMITIES:  2+ Peripheral Pulses, No clubbing, cyanosis, or edema  SKIN: warm dry                          13.5   7.74  )-----------( 157      ( 12 Jul 2023 06:20 )             39.5     07-12    139  |  107  |  13  ----------------------------<  127<H>  3.8   |  23  |  0.78    Ca    8.2<L>      12 Jul 2023 06:20    TPro  6.2  /  Alb  2.7<L>  /  TBili  0.4  /  DBili  0.1  /  AST  39  /  ALT  38  /  AlkPhos  51  07-12    LIVER FUNCTIONS - ( 12 Jul 2023 06:20 )  Alb: 2.7 g/dL / Pro: 6.2 g/dL / ALK PHOS: 51 U/L / ALT: 38 U/L DA / AST: 39 U/L / GGT: x               PT/INR - ( 12 Jul 2023 06:20 )   PT: 12.1 sec;   INR: 1.02 ratio         PTT - ( 10 Jul 2023 23:30 )  PTT:28.2 sec    CAPILLARY BLOOD GLUCOSE      RADIOLOGY & ADDITIONAL TESTS:

## 2023-07-12 NOTE — CONSULT NOTE ADULT - SUBJECTIVE AND OBJECTIVE BOX
Time of visit:    CHIEF COMPLAINT: Patient is a 62y old  Male who presents with a chief complaint of Hiccups (11 Jul 2023 14:45)      HPI:  This is a 62 year old male, coming from home, with no medical history coming in with hiccups along with shortness of breath. Pt states this has ongoing since Thursday he has come to the ED multiple times for the complaints. He was found to have COVID, he also has been expereincing shortness of breath along with body aches headaches and chills at home. He also states when he has the bouts of hiccups they occur in 5's and he has a hard time breathing after for a few seconds. He denies any  visual disturbances, N/V/D, dizziness, falls, chest pain, palpitations, lower extremity swelling, skin rash, recent travel, or sick contacts   (10 Jul 2023 23:52)   Patient seen and examined.     PAST MEDICAL & SURGICAL HISTORY:  No pertinent past medical history      S/P left knee arthroscopy      S/P shoulder surgery          Allergies    No Known Allergies    Intolerances        MEDICATIONS  (STANDING):  aluminum hydroxide/magnesium hydroxide/simethicone Suspension 15 milliLiter(s) Oral every 12 hours  benzonatate 100 milliGRAM(s) Oral three times a day  chlorproMAZINE    Tablet 12.5 milliGRAM(s) Oral two times a day  dexAMETHasone  Injectable 6 milliGRAM(s) IV Push daily  enoxaparin Injectable 40 milliGRAM(s) SubCutaneous every 24 hours  guaifenesin/dextromethorphan Oral Liquid 10 milliLiter(s) Oral every 6 hours  pantoprazole  Injectable 40 milliGRAM(s) IV Push daily  remdesivir  IVPB 100 milliGRAM(s) IV Intermittent every 24 hours  remdesivir  IVPB   IV Intermittent       MEDICATIONS  (PRN):  acetaminophen     Tablet .. 650 milliGRAM(s) Oral every 6 hours PRN Temp greater or equal to 38C (100.4F), Mild Pain (1 - 3)  melatonin 3 milliGRAM(s) Oral at bedtime PRN Insomnia  ondansetron Injectable 4 milliGRAM(s) IV Push every 8 hours PRN Nausea and/or Vomiting   Medications up to date at time of exam.    Medications up to date at time of exam.    FAMILY HISTORY:      SOCIAL HISTORY  Smoking History: [x   ]  none smoking/smoke exposure, [   ] former smoker  Living Condition: [   ] apartment, [   ] private house  Work History: retired   Travel History: denies recent travel  Illicit Substance Use: denies  Alcohol Use: denies    REVIEW OF SYSTEMS:    CONSTITUTIONAL:  denies fevers, chills, sweats, weight loss    HEENT:  denies diplopia or blurred vision, sore throat or runny nose.    CARDIOVASCULAR:  denies pressure, squeezing, tightness, or heaviness about the chest; no palpitations.    RESPIRATORY:  denies SOB, cough, BADILLO, wheezing.    GASTROINTESTINAL:  denies abdominal pain, nausea, vomiting or diarrhea.    GENITOURINARY: denies dysuria, frequency or urgency.    NEUROLOGIC:  denies numbness, tingling, seizures or weakness.    PSYCHIATRIC:  denies disorder of thought or mood.    MSK: denies swelling, redness      PHYSICAL EXAMINATION:    GENERAL: The patient is a well-developed, well-nourished, in no apparent distress.     Vital Signs Last 24 Hrs  T(C): 36.2 (12 Jul 2023 11:25), Max: 36.9 (11 Jul 2023 20:49)  T(F): 97.2 (12 Jul 2023 11:25), Max: 98.4 (11 Jul 2023 20:49)  HR: 82 (12 Jul 2023 11:25) (64 - 82)  BP: 107/78 (12 Jul 2023 11:25) (107/78 - 112/69)  BP(mean): 77 (12 Jul 2023 05:39) (77 - 77)  RR: 18 (12 Jul 2023 11:25) (18 - 18)  SpO2: 96% (12 Jul 2023 11:25) (96% - 98%)    Parameters below as of 12 Jul 2023 11:25  Patient On (Oxygen Delivery Method): room air       (if applicable)    Chest Tube (if applicable)    HEENT: Head is normocephalic and atraumatic. Extraocular muscles are intact. Mucous membranes are moist.     NECK: Supple, no palpable adenopathy.    LUNGS: Clear to auscultation, no wheezing, rales, or rhonchi.    HEART: Regular rate and rhythm without murmur.    ABDOMEN: Soft, nontender, and nondistended.  No hepatosplenomegaly is noted.    RENAL: No difficulty voiding, no pelvic pain    EXTREMITIES: Without any cyanosis, clubbing, rash, lesions or edema.    NEUROLOGIC: Awake, alert, oriented, grossly intact    SKIN: Warm, dry, good turgor.      LABS:                        13.5   7.74  )-----------( 157      ( 12 Jul 2023 06:20 )             39.5     07-12    139  |  107  |  13  ----------------------------<  127<H>  3.8   |  23  |  0.78    Ca    8.2<L>      12 Jul 2023 06:20    TPro  6.2  /  Alb  2.7<L>  /  TBili  0.4  /  DBili  0.1  /  AST  39  /  ALT  38  /  AlkPhos  51  07-12    PT/INR - ( 12 Jul 2023 06:20 )   PT: 12.1 sec;   INR: 1.02 ratio         PTT - ( 10 Jul 2023 23:30 )  PTT:28.2 sec  Urinalysis Basic - ( 12 Jul 2023 06:20 )    Color: x / Appearance: x / SG: x / pH: x  Gluc: 127 mg/dL / Ketone: x  / Bili: x / Urobili: x   Blood: x / Protein: x / Nitrite: x   Leuk Esterase: x / RBC: x / WBC x   Sq Epi: x / Non Sq Epi: x / Bacteria: x                  Procalcitonin, Serum: 0.07 ng/mL (07-10-23 @ 23:30)      MICROBIOLOGY: (if applicable)  COVID-19 PCR: Detected:       RADIOLOGY & ADDITIONAL STUDIES:  EKG:   CXR:< from: CT Chest w/ IV Cont (07.09.23 @ 15:16) >    ACC: [id number] EXAM:  CT CHEST IC   ORDERED BY: CALLIE CHARLES     PROCEDURE DATE:  07/09/2023          INTERPRETATION:  CLINICAL INFORMATION: Cough, shortness of breath    COMPARISON: CT chest 11/16/2011.    CONTRAST/COMPLICATIONS:  IV Contrast: Omnipaque 350  90 cc administered   10 cc discarded  Oral Contrast: NONE  Complications: None reported at time of study completion    PROCEDURE:  CT of the Chest was performed.  Sagittal and coronal reformats were performed.    FINDINGS:    LUNGS AND AIRWAYS: Patent central airways. Confluent groundglass opacity   in the lingula segment, mild and patchy groundglass nodules in the middle   lobe and left lower lobe. No consolidative opacity. No solid pulmonary   nodules.  PLEURA: No pleural effusion.  MEDIASTINUM AND BAYRON: No lymphadenopathy. Subcentimeter bilateral hilar   and subcarinal lymph nodes.  VESSELS: No pulmonary embolism. Normal caliber of the thoracic aorta.   Coronary artery calcifications.  HEART: Heart size is normal. No pericardial effusion.  CHEST WALL AND LOWER NECK: Thyroid nodules.  VISUALIZED UPPER ABDOMEN: Left liver lobe subcentimeter hypodensity.   Partially visualized spleen, pancreas, adrenal glands and gallbladder are   within normal limits. Renal cysts.  BONES: Degenerative changes of the spine.    IMPRESSION:  Confluent groundglass opacity in the left upper lobe lingula segment,   left lower lobe and middle lobe compatible with multifocal pneumonia   which may be of nonbacterial origin.    No acute pulmonaryembolism.        --- End of Report ---            ORQUIDEA ARROYO MD; Attending Radiologist  This document has been electronically signed. Jul 9 2023  4:15PM    < end of copied text >    ECHO:    IMPRESSION: 62y Male PAST MEDICAL & SURGICAL HISTORY:  No pertinent past medical history      S/P left knee arthroscopy      S/P shoulder surgery       p/w         IMP: This is a 62 year old man , none smoker , coming from home, with no medical history admitted for intractable  hiccups along with shortness of breath. SOB due to covid-19 pna. He is not hypoxic and tolerating room air .       Sugg  - Monitor pulse oximetry   - Complete 5 days course of Remdesivir   - Decadron 6 mg /day x 10 days   - Albuterol inhaler 2 puff q6h   - Continue isolation ; contact and airborne   - Out pat pulmonary f/u with PFT   - Thorazine for hiccups   - Tessalon pearls 100 mg q8h x 5 days   - DVT GI prophy    Time of visit:    CHIEF COMPLAINT: Patient is a 62y old  Male who presents with a chief complaint of Hiccups (11 Jul 2023 14:45)      HPI:  This is a 62 year old male, coming from home, with no medical history coming in with hiccups along with shortness of breath. Pt states this has ongoing since Thursday he has come to the ED multiple times for the complaints. He was found to have COVID, he also has been expereincing shortness of breath along with body aches headaches and chills at home. He also states when he has the bouts of hiccups they occur in 5's and he has a hard time breathing after for a few seconds. He denies any  visual disturbances, N/V/D, dizziness, falls, chest pain, palpitations, lower extremity swelling, skin rash, recent travel, or sick contacts   (10 Jul 2023 23:52)   Patient seen and examined.     PAST MEDICAL & SURGICAL HISTORY:  No pertinent past medical history      S/P left knee arthroscopy      S/P shoulder surgery          Allergies    No Known Allergies    Intolerances        MEDICATIONS  (STANDING):  aluminum hydroxide/magnesium hydroxide/simethicone Suspension 15 milliLiter(s) Oral every 12 hours  benzonatate 100 milliGRAM(s) Oral three times a day  chlorproMAZINE    Tablet 12.5 milliGRAM(s) Oral two times a day  dexAMETHasone  Injectable 6 milliGRAM(s) IV Push daily  enoxaparin Injectable 40 milliGRAM(s) SubCutaneous every 24 hours  guaifenesin/dextromethorphan Oral Liquid 10 milliLiter(s) Oral every 6 hours  pantoprazole  Injectable 40 milliGRAM(s) IV Push daily  remdesivir  IVPB 100 milliGRAM(s) IV Intermittent every 24 hours  remdesivir  IVPB   IV Intermittent       MEDICATIONS  (PRN):  acetaminophen     Tablet .. 650 milliGRAM(s) Oral every 6 hours PRN Temp greater or equal to 38C (100.4F), Mild Pain (1 - 3)  melatonin 3 milliGRAM(s) Oral at bedtime PRN Insomnia  ondansetron Injectable 4 milliGRAM(s) IV Push every 8 hours PRN Nausea and/or Vomiting   Medications up to date at time of exam.    Medications up to date at time of exam.    FAMILY HISTORY:      SOCIAL HISTORY  Smoking History: [x   ]  none smoking/smoke exposure, [   ] former smoker  Living Condition: [   ] apartment, [   ] private house  Work History: retired   Travel History: denies recent travel  Illicit Substance Use: denies  Alcohol Use: denies    REVIEW OF SYSTEMS:    CONSTITUTIONAL:  denies fevers, chills, sweats, weight loss    HEENT:  denies diplopia or blurred vision, sore throat or runny nose.    CARDIOVASCULAR:  denies pressure, squeezing, tightness, or heaviness about the chest; no palpitations.    RESPIRATORY:  denies SOB, cough, BADILLO, wheezing.    GASTROINTESTINAL:  denies abdominal pain, nausea, vomiting or diarrhea.    GENITOURINARY: denies dysuria, frequency or urgency.    NEUROLOGIC:  denies numbness, tingling, seizures or weakness.    PSYCHIATRIC:  denies disorder of thought or mood.    MSK: denies swelling, redness      PHYSICAL EXAMINATION:    GENERAL: The patient is a well-developed, well-nourished, in no apparent distress.     Vital Signs Last 24 Hrs  T(C): 36.2 (12 Jul 2023 11:25), Max: 36.9 (11 Jul 2023 20:49)  T(F): 97.2 (12 Jul 2023 11:25), Max: 98.4 (11 Jul 2023 20:49)  HR: 82 (12 Jul 2023 11:25) (64 - 82)  BP: 107/78 (12 Jul 2023 11:25) (107/78 - 112/69)  BP(mean): 77 (12 Jul 2023 05:39) (77 - 77)  RR: 18 (12 Jul 2023 11:25) (18 - 18)  SpO2: 96% (12 Jul 2023 11:25) (96% - 98%)    Parameters below as of 12 Jul 2023 11:25  Patient On (Oxygen Delivery Method): room air       (if applicable)    Chest Tube (if applicable)    HEENT: Head is normocephalic and atraumatic. Extraocular muscles are intact. Mucous membranes are moist.     NECK: Supple, no palpable adenopathy.    LUNGS: Clear to auscultation, no wheezing, rales, or rhonchi.    HEART: Regular rate and rhythm without murmur.    ABDOMEN: Soft, nontender, and nondistended.  No hepatosplenomegaly is noted.    RENAL: No difficulty voiding, no pelvic pain    EXTREMITIES: Without any cyanosis, clubbing, rash, lesions or edema.    NEUROLOGIC: Awake, alert, oriented, grossly intact    SKIN: Warm, dry, good turgor.      LABS:                        13.5   7.74  )-----------( 157      ( 12 Jul 2023 06:20 )             39.5     07-12    139  |  107  |  13  ----------------------------<  127<H>  3.8   |  23  |  0.78    Ca    8.2<L>      12 Jul 2023 06:20    TPro  6.2  /  Alb  2.7<L>  /  TBili  0.4  /  DBili  0.1  /  AST  39  /  ALT  38  /  AlkPhos  51  07-12    PT/INR - ( 12 Jul 2023 06:20 )   PT: 12.1 sec;   INR: 1.02 ratio         PTT - ( 10 Jul 2023 23:30 )  PTT:28.2 sec  Urinalysis Basic - ( 12 Jul 2023 06:20 )    Color: x / Appearance: x / SG: x / pH: x  Gluc: 127 mg/dL / Ketone: x  / Bili: x / Urobili: x   Blood: x / Protein: x / Nitrite: x   Leuk Esterase: x / RBC: x / WBC x   Sq Epi: x / Non Sq Epi: x / Bacteria: x                  Procalcitonin, Serum: 0.07 ng/mL (07-10-23 @ 23:30)      MICROBIOLOGY: (if applicable)  COVID-19 PCR: Detected:       RADIOLOGY & ADDITIONAL STUDIES:  EKG:   CXR:< from: CT Chest w/ IV Cont (07.09.23 @ 15:16) >    ACC: [id number] EXAM:  CT CHEST IC   ORDERED BY: CALLIE CHARLES     PROCEDURE DATE:  07/09/2023          INTERPRETATION:  CLINICAL INFORMATION: Cough, shortness of breath    COMPARISON: CT chest 11/16/2011.    CONTRAST/COMPLICATIONS:  IV Contrast: Omnipaque 350  90 cc administered   10 cc discarded  Oral Contrast: NONE  Complications: None reported at time of study completion    PROCEDURE:  CT of the Chest was performed.  Sagittal and coronal reformats were performed.    FINDINGS:    LUNGS AND AIRWAYS: Patent central airways. Confluent groundglass opacity   in the lingula segment, mild and patchy groundglass nodules in the middle   lobe and left lower lobe. No consolidative opacity. No solid pulmonary   nodules.  PLEURA: No pleural effusion.  MEDIASTINUM AND BAYRON: No lymphadenopathy. Subcentimeter bilateral hilar   and subcarinal lymph nodes.  VESSELS: No pulmonary embolism. Normal caliber of the thoracic aorta.   Coronary artery calcifications.  HEART: Heart size is normal. No pericardial effusion.  CHEST WALL AND LOWER NECK: Thyroid nodules.  VISUALIZED UPPER ABDOMEN: Left liver lobe subcentimeter hypodensity.   Partially visualized spleen, pancreas, adrenal glands and gallbladder are   within normal limits. Renal cysts.  BONES: Degenerative changes of the spine.    IMPRESSION:  Confluent groundglass opacity in the left upper lobe lingula segment,   left lower lobe and middle lobe compatible with multifocal pneumonia   which may be of nonbacterial origin.    No acute pulmonaryembolism.        --- End of Report ---            ORQUIDEA ARROYO MD; Attending Radiologist  This document has been electronically signed. Jul 9 2023  4:15PM    < end of copied text >    ECHO:    IMPRESSION: 62y Male PAST MEDICAL & SURGICAL HISTORY:  No pertinent past medical history      S/P left knee arthroscopy      S/P shoulder surgery       p/w         IMP: This is a 62 year old man , none smoker , coming from home, with no medical history admitted for intractable  hiccups along with shortness of breath. SOB due to covid-19 pna. He is not hypoxic and tolerating room air . Heart failure is unlikely in light of normal proBNP       Sugg  - Monitor pulse oximetry   - Complete 5 days course of Remdesivir   - Decadron 6 mg /day x 10 days   - Albuterol inhaler 2 puff q6h   - Continue isolation ; contact and airborne   - Out pat pulmonary f/u with PFT   - Thorazine for hiccups   - Tessalon pearls 100 mg q8h x 5 days   - DVT GI prophy

## 2023-07-12 NOTE — PROGRESS NOTE ADULT - PROBLEM SELECTOR PLAN 2
Pt presenting with intractable hiccups.   Suspecting in the setting of GERD   s/p pantoprazole 40mg IV push,  c/w antacid (aluminum hydroxide/magnesium hydroxide/simethicone)   started on chlorpromazine 12.5mg bid  ondansetron prn  will be discharged on chlorpromazine 12.5 BID for 3 days

## 2023-07-12 NOTE — DISCHARGE NOTE NURSING/CASE MANAGEMENT/SOCIAL WORK - NURSING SECTION COMPLETE
Head,  normocephalic,  atraumatic,  Face,  Face within normal limits,  Ears,  External ears within normal limits,  Nose/Nasopharynx,  External nose  normal appearance,  nares patent,  no nasal discharge,  Mouth and Throat,  Oral cavity appearance normal,  Breath odor normal,  Lips,  Appearance normal
Patient/Caregiver provided printed discharge information.

## 2023-07-12 NOTE — PROGRESS NOTE ADULT - PROBLEM SELECTOR PLAN 1
saturating well on room air  CT: Confluent ground-glass opacity in the left upper lobe lingula segment, left lower lobe and middle lobe compatible with multifocal pneumonia which may be of nonbacterial origin.  Dexamethasone 6mg IV qd, remdesivir   robitussin syrup, benzonatate 100mg tid, Tylenol prn  incentive spirometer  will be DC on prednisone 40mg for 5 days and paxlovid already provided by PCP   with PRN Robitussin     Maintain O2 92-95%, o2 support titrate as needed.

## 2023-07-12 NOTE — PROGRESS NOTE ADULT - ASSESSMENT
seen and examined on chair  no cough  ( minimally)   has Hiccups but better  vs stable afebrile physical done ok  lungs clear    labs noted   cxr neg    a/p covid pos   pneumonia  on remdesivir and dexa  seen by pulmonary  as per pulmonary remdesivir for 5 days  but pt s symptoms have improved  a lot   d/w pulm  if no sob pulse ox ok  hemodyanimacally stable  can be dcd   pt has paxlovid at home  he can take   hiccups  omeperazole  , chlorpromazine 12.5 bid for 3-4 days  so far no side effects  dc palnning

## 2023-07-13 LAB
M PNEUMO IGM SER-ACNC: 0.12 INDEX — SIGNIFICANT CHANGE UP (ref 0–0.9)
MYCOPLASMA AG SPEC QL: NEGATIVE — SIGNIFICANT CHANGE UP

## 2023-07-16 LAB
CULTURE RESULTS: SIGNIFICANT CHANGE UP
CULTURE RESULTS: SIGNIFICANT CHANGE UP
SPECIMEN SOURCE: SIGNIFICANT CHANGE UP
SPECIMEN SOURCE: SIGNIFICANT CHANGE UP

## 2024-09-06 NOTE — ED ADULT NURSE NOTE - RESPIRATORY ASSESSMENT
You were seen in the hospital for:  1. Stress    2. Encounter for psychiatric assessment      Your evaluation found:  -No active suicidal thoughts, TSH was normal and is not contributing to your mood.  Psychiatry did not feel that you need inpatient admission for further management of your symptoms    Specific instructions for discharge:  -Return to the emergency department if you have further thoughts of self-harm.  If you feel unsafe at home, you can call [phone]     Results to be followed up on:  -None    Follow up with:  -Your primary care physician within 1 week for recheck of symptoms    You should return to the emergency department if your symptoms worsen or do not resolve. In addition, return if:  -You have a fever (greater than 101 degrees)  -You have chest pain, shortness of breath, abdominal pain, or uncontrollable vomiting  -You are unable to eat or drink  -You pass out  -You have difficulty moving your arms or legs   -You have difficulty speaking or slurred speech  -Or you have any concern that you feel needs acute physician evaluation    
- - -
